# Patient Record
Sex: FEMALE | Race: WHITE | NOT HISPANIC OR LATINO | Employment: OTHER | URBAN - METROPOLITAN AREA
[De-identification: names, ages, dates, MRNs, and addresses within clinical notes are randomized per-mention and may not be internally consistent; named-entity substitution may affect disease eponyms.]

---

## 2017-01-11 DIAGNOSIS — Z00.00 HEALTH CARE MAINTENANCE: ICD-10-CM

## 2017-01-11 DIAGNOSIS — E55.9 VITAMIN D DEFICIENCY: Primary | ICD-10-CM

## 2017-01-11 DIAGNOSIS — E78.5 HYPERLIPIDEMIA, UNSPECIFIED HYPERLIPIDEMIA TYPE: ICD-10-CM

## 2017-01-16 ENCOUNTER — RESULTS ENCOUNTER (OUTPATIENT)
Dept: FAMILY MEDICINE CLINIC | Facility: CLINIC | Age: 77
End: 2017-01-16

## 2017-01-16 DIAGNOSIS — Z00.00 HEALTH CARE MAINTENANCE: ICD-10-CM

## 2017-01-16 DIAGNOSIS — E78.5 HYPERLIPIDEMIA, UNSPECIFIED HYPERLIPIDEMIA TYPE: ICD-10-CM

## 2017-01-16 DIAGNOSIS — E55.9 VITAMIN D DEFICIENCY: ICD-10-CM

## 2017-01-16 LAB
25(OH)D3+25(OH)D2 SERPL-MCNC: 39 NG/ML (ref 30–100)
ALBUMIN SERPL-MCNC: 4.1 G/DL (ref 3.5–5.2)
ALBUMIN/GLOB SERPL: 1.5 G/DL
ALP SERPL-CCNC: 92 U/L (ref 39–117)
ALT SERPL-CCNC: 19 U/L (ref 1–33)
AST SERPL-CCNC: 17 U/L (ref 1–32)
BASOPHILS # BLD AUTO: 0.02 10*3/MM3 (ref 0–0.2)
BASOPHILS NFR BLD AUTO: 0.3 % (ref 0–1.5)
BILIRUB SERPL-MCNC: 0.4 MG/DL (ref 0.1–1.2)
BUN SERPL-MCNC: 19 MG/DL (ref 8–23)
BUN/CREAT SERPL: 22.6 (ref 7–25)
CALCIUM SERPL-MCNC: 9.5 MG/DL (ref 8.6–10.5)
CHLORIDE SERPL-SCNC: 102 MMOL/L (ref 98–107)
CHOLEST SERPL-MCNC: 190 MG/DL (ref 0–200)
CO2 SERPL-SCNC: 25.7 MMOL/L (ref 22–29)
CREAT SERPL-MCNC: 0.84 MG/DL (ref 0.57–1)
EOSINOPHIL # BLD AUTO: 0.16 10*3/MM3 (ref 0–0.7)
EOSINOPHIL NFR BLD AUTO: 2.5 % (ref 0.3–6.2)
ERYTHROCYTE [DISTWIDTH] IN BLOOD BY AUTOMATED COUNT: 13.5 % (ref 11.7–13)
GLOBULIN SER CALC-MCNC: 2.7 GM/DL
GLUCOSE SERPL-MCNC: 97 MG/DL (ref 65–99)
HCT VFR BLD AUTO: 40.4 % (ref 35.6–45.5)
HDLC SERPL-MCNC: 62 MG/DL (ref 40–60)
HGB BLD-MCNC: 12.8 G/DL (ref 11.9–15.5)
IMM GRANULOCYTES # BLD: 0 10*3/MM3 (ref 0–0.03)
IMM GRANULOCYTES NFR BLD: 0 % (ref 0–0.5)
LDLC SERPL CALC-MCNC: 113 MG/DL (ref 0–100)
LDLC/HDLC SERPL: 1.82 {RATIO}
LYMPHOCYTES # BLD AUTO: 1.44 10*3/MM3 (ref 0.9–4.8)
LYMPHOCYTES NFR BLD AUTO: 22.3 % (ref 19.6–45.3)
MCH RBC QN AUTO: 30.5 PG (ref 26.9–32)
MCHC RBC AUTO-ENTMCNC: 31.7 G/DL (ref 32.4–36.3)
MCV RBC AUTO: 96.4 FL (ref 80.5–98.2)
MONOCYTES # BLD AUTO: 0.48 10*3/MM3 (ref 0.2–1.2)
MONOCYTES NFR BLD AUTO: 7.4 % (ref 5–12)
NEUTROPHILS # BLD AUTO: 4.35 10*3/MM3 (ref 1.9–8.1)
NEUTROPHILS NFR BLD AUTO: 67.5 % (ref 42.7–76)
PLATELET # BLD AUTO: 274 10*3/MM3 (ref 140–500)
POTASSIUM SERPL-SCNC: 5.1 MMOL/L (ref 3.5–5.2)
PROT SERPL-MCNC: 6.8 G/DL (ref 6–8.5)
RBC # BLD AUTO: 4.19 10*6/MM3 (ref 3.9–5.2)
SODIUM SERPL-SCNC: 140 MMOL/L (ref 136–145)
TRIGL SERPL-MCNC: 75 MG/DL (ref 0–150)
VLDLC SERPL CALC-MCNC: 15 MG/DL (ref 5–40)
WBC # BLD AUTO: 6.45 10*3/MM3 (ref 4.5–10.7)

## 2017-01-23 ENCOUNTER — OFFICE VISIT (OUTPATIENT)
Dept: FAMILY MEDICINE CLINIC | Facility: CLINIC | Age: 77
End: 2017-01-23

## 2017-01-23 VITALS
HEART RATE: 90 BPM | BODY MASS INDEX: 28.44 KG/M2 | TEMPERATURE: 98.3 F | HEIGHT: 64 IN | DIASTOLIC BLOOD PRESSURE: 80 MMHG | SYSTOLIC BLOOD PRESSURE: 124 MMHG | OXYGEN SATURATION: 96 % | RESPIRATION RATE: 16 BRPM | WEIGHT: 166.6 LBS

## 2017-01-23 DIAGNOSIS — Z00.00 HEALTH CARE MAINTENANCE: ICD-10-CM

## 2017-01-23 DIAGNOSIS — I10 ESSENTIAL HYPERTENSION: ICD-10-CM

## 2017-01-23 DIAGNOSIS — M17.11 PRIMARY OSTEOARTHRITIS OF RIGHT KNEE: Primary | ICD-10-CM

## 2017-01-23 PROCEDURE — 99214 OFFICE O/P EST MOD 30 MIN: CPT | Performed by: INTERNAL MEDICINE

## 2017-01-23 NOTE — MR AVS SNAPSHOT
Terri MORENO Garrett   2017 1:45 PM   Office Visit    Dept Phone:  [phone]   Encounter #:  [id number]    Provider:  Nathan Lazo MD   Department:  [facility]                Your Full Care Plan              Your Updated Medication List          This list is accurate as of: 17  2:16 PM.  Always use your most recent med list.                aspirin 81 MG EC tablet       bacitracin 500 UNIT/GM ointment       calcium chew 500 MG chewable tablet       CHLOR-TRIMETON ALLERGY 12 MG tablet controlled-release   Generic drug:  Chlorpheniramine Maleate ER       clobetasol 0.05 % ointment   Commonly known as:  TEMOVATE       GLUCOSAMINE CHONDR 1500 COMPLX PO       losartan 100 MG tablet   Commonly known as:  COZAAR   TAKE 1 TABLET DAILY (NEED APPOINTMENT WITH NEW DOCTOR BEFORE THIS PRESCRIPTION RUNS OUT )       MULTI FOR HER 50+ PO               You Were Diagnosed With        Codes Comments    Primary osteoarthritis of right knee    -  Primary ICD-10-CM: M17.11  ICD-9-CM: 715.16       Instructions     None    Patient Instructions History      Upcoming Appointments     Visit Type Date Time Department    OFFICE VISIT 2017  1:45 PM [employer] Signup     [facility] allows you to send messages to your doctor, view your test results, renew your prescriptions, schedule appointments, and more. To sign up, go to [employer] and click on the Sign Up Now link in the New User? box. Enter your [employer] Activation Code exactly as it appears below along with the last four digits of your Social Security Number and your Date of Birth () to complete the sign-up process. If you do not sign up before the expiration date, you must request a new code.    [employer] Activation Code: [email or web address]  Expires: 2017 11:21 AM    If you have questions, you can email [email or web address] or call [phone] to talk to our  "MyChart staff. Remember, MyChart is NOT to be used for urgent needs. For medical emergencies, dial 911.               Other Info from Your Visit           Allergies     Chocolate      Citrus      Codeine      Other      Balsam of [place] - causes contact dermatitis    Pneumococcal Polysaccharide Vaccine        Reason for Visit     Hyperlipidemia f/u, already had labs drawn    Hypertension           Vital Signs     Blood Pressure Pulse Temperature Respirations Height Weight    124/80 90 98.3 °F (36.8 °C) (Oral) 16 63.5\" (161.3 cm) 166 lb 9.6 oz (75.6 kg)    Oxygen Saturation Body Mass Index Smoking Status             96% 29.05 kg/m2 Former Smoker         Problems and Diagnoses Noted     Degenerative joint disease of lower leg        "

## 2017-01-23 NOTE — PROGRESS NOTES
"Subjective   Patient ID: Terri Garrett is a 76 y.o. female presents with   Chief Complaint   Patient presents with   • Hyperlipidemia     f/u, already had labs drawn   • Hypertension       HPI - this patient has a history of mild hyperlipidemia, hypertension, osteoarthritis the right knee and degenerative disc disease of the neck.  We reviewed her labs.  Overall labs look good.  Blood pressure is well controlled with losartan.  She is caught up on routine screenings.  She cannot take the Prevnar because she's had trouble with Pneumovax in the past.  Overall she feels good however.    Assessment plan    Assessment plan    Primary osteoarthritis right knee-recommend the patient stay active and continuing doing physical therapy exercises when necessary nonsteroidal anti-inflammatories.    Hypertension controlled with losartan 100    Health care maintenance-she is caught up on mammogram cannot take Prevnar.        Allergies   Allergen Reactions   • Chocolate    • Citrus    • Codeine    • Other      Balsam of [place] - causes contact dermatitis   • Pneumococcal Polysaccharide Vaccine        The following portions of the patient's history were reviewed and updated as appropriate: allergies, current medications, past family history, past medical history, past social history, past surgical history and problem list.      Review of Systems   Constitutional: Negative.    HENT: Negative.    Eyes: Negative.    Respiratory: Negative.    Cardiovascular: Negative.    Gastrointestinal: Negative.    Endocrine: Negative.    Genitourinary: Negative.    Musculoskeletal: Positive for arthralgias, gait problem and neck pain.   Skin: Negative.    Psychiatric/Behavioral: Negative.        Objective     Vitals:    01/23/17 1328   BP: 124/80   Pulse: 90   Resp: 16   Temp: 98.3 °F (36.8 °C)   TempSrc: Oral   SpO2: 96%   Weight: 166 lb 9.6 oz (75.6 kg)   Height: 63.5\" (161.3 cm)         Physical Exam   Constitutional: She is oriented to " person, place, and time. She appears well-developed and well-nourished. No distress.   HENT:   Head: Normocephalic and atraumatic.   Eyes: Conjunctivae and EOM are normal. Pupils are equal, round, and reactive to light. Right eye exhibits no discharge. Left eye exhibits no discharge. No scleral icterus.   Neck: Normal range of motion. Neck supple. No tracheal deviation present. No thyromegaly present.   Cardiovascular: Normal rate, regular rhythm, normal heart sounds and normal pulses.  Exam reveals no gallop.    No murmur heard.  Pulmonary/Chest: Effort normal and breath sounds normal. No respiratory distress. She has no wheezes. She has no rales.   Abdominal: Soft. Bowel sounds are normal. There is no tenderness.   Musculoskeletal: Normal range of motion.   Neurological: She is alert and oriented to person, place, and time.   Skin: Skin is warm and dry. No rash noted. No erythema. No pallor.   Psychiatric: She has a normal mood and affect. Her behavior is normal. Judgment and thought content normal.   Nursing note and vitals reviewed.        Terri was seen today for hyperlipidemia and hypertension.    Diagnoses and all orders for this visit:    Primary osteoarthritis of right knee    Essential hypertension    Health care maintenance        Call or return to clinic prn if these symptoms worsen or fail to improve as anticipated.

## 2017-02-22 RX ORDER — LOSARTAN POTASSIUM 100 MG/1
TABLET ORAL
Qty: 90 TABLET | Refills: 2 | Status: SHIPPED | OUTPATIENT
Start: 2017-02-22 | End: 2017-12-05 | Stop reason: SDUPTHER

## 2017-12-05 RX ORDER — LOSARTAN POTASSIUM 100 MG/1
TABLET ORAL
Qty: 90 TABLET | Refills: 2 | Status: SHIPPED | OUTPATIENT
Start: 2017-12-05

## 2018-02-06 ENCOUNTER — APPOINTMENT (OUTPATIENT)
Dept: WOMENS IMAGING | Facility: HOSPITAL | Age: 78
End: 2018-02-06

## 2018-02-06 PROCEDURE — 77067 SCR MAMMO BI INCL CAD: CPT | Performed by: RADIOLOGY

## 2018-02-06 PROCEDURE — 77063 BREAST TOMOSYNTHESIS BI: CPT | Performed by: RADIOLOGY

## 2018-05-30 ENCOUNTER — OFFICE VISIT (OUTPATIENT)
Dept: ORTHOPEDIC SURGERY | Facility: CLINIC | Age: 78
End: 2018-05-30

## 2018-05-30 VITALS — HEIGHT: 64 IN | BODY MASS INDEX: 27.69 KG/M2 | TEMPERATURE: 97.7 F | WEIGHT: 162.2 LBS

## 2018-05-30 DIAGNOSIS — G89.29 CHRONIC PAIN OF RIGHT KNEE: Primary | ICD-10-CM

## 2018-05-30 DIAGNOSIS — M25.561 CHRONIC PAIN OF RIGHT KNEE: Primary | ICD-10-CM

## 2018-05-30 DIAGNOSIS — M17.11 PRIMARY OSTEOARTHRITIS OF RIGHT KNEE: ICD-10-CM

## 2018-05-30 PROCEDURE — 73564 X-RAY EXAM KNEE 4 OR MORE: CPT | Performed by: ORTHOPAEDIC SURGERY

## 2018-05-30 PROCEDURE — 99214 OFFICE O/P EST MOD 30 MIN: CPT | Performed by: ORTHOPAEDIC SURGERY

## 2018-05-30 NOTE — PROGRESS NOTES
"Patient:  Terri Garrett is a 77 y.o. female    Chief Complaint/ Reason for Visit:    Chief Complaint   Patient presents with   • Right Knee - Establish Care, Pain       HPI:  This pleasant lady presents today complaining of progressively worsening moderate occasionally severe aching occasionally stabbing pain in the posterior medial aspect of her right knee.  She says that it locks up oftentimes after prolonged standing and walking, and she actually has to use her hands to \"unlock it.\"  She says at times, she will have \"severe, excruciating pain\" and points to the posterior medial aspect of the right knee as its location.  She says \"I can't straighten my knee out.\"  I have seen her in the past for the right knee, and she is done physical therapy for months, but says \"my motion has actually gotten worse.\"  She does have episodic swelling in the right knee associated with prolonged weightbearing activities.    She cannot go down stairs reciprocally, and has severe sharp pain medially and anteriorly in the right knee when descending stairs, even \"1 leg at a time.\"  Rest, elevation, and reduced activities are the only things that seemed to help.      PMH:    Past Medical History:   Diagnosis Date   • Breast cancer    • Hypertension        PSH:    Past Surgical History:   Procedure Laterality Date   • BREAST LUMPECTOMY     • CATARACT EXTRACTION     • KNEE SURGERY Right 2013    [facility]       Social Hx:    Social History     Social History   • Marital status:      Spouse name: N/A   • Number of children: N/A   • Years of education: N/A     Occupational History   • Not on file.     Social History Main Topics   • Smoking status: Former Smoker     Quit date: 8/22/1976   • Smokeless tobacco: Never Used   • Alcohol use Yes      Comment: 1 glass of wine with dinner   • Drug use: Unknown   • Sexual activity: Defer     Other Topics Concern   • Not on file     Social History Narrative   • No narrative on file " "      Family Hx:    Family History   Problem Relation Age of Onset   • Cancer Mother         breast       Meds:    Current Outpatient Prescriptions:   •  aspirin 81 MG EC tablet, Take  by mouth daily., Disp: , Rfl:   •  Calcium Carbonate (CALCIUM CHEW) 500 MG chewable tablet, Chew daily., Disp: , Rfl:   •  Chlorpheniramine Maleate ER (CHLOR-TRIMETON ALLERGY) 12 MG tablet controlled-release, Take  by mouth., Disp: , Rfl:   •  Glucosamine-Chondroit-Vit C-Mn (GLUCOSAMINE CHONDR 1500 COMPLX PO), Take  by mouth daily., Disp: , Rfl:   •  losartan (COZAAR) 100 MG tablet, TAKE 1 TABLET DAILY (NEED APPOINTMENT WITH NEW DOCTOR BEFORE THIS PRESCRIPTION RUNS OUT ), Disp: 90 tablet, Rfl: 2  •  Multiple Vitamins-Minerals (MULTI FOR HER 50+ PO), Take  by mouth daily., Disp: , Rfl:   •  bacitracin 500 UNIT/GM ointment, Apply  topically., Disp: , Rfl:   •  clobetasol (TEMOVATE) 0.05 % ointment, Apply 1 inch topically 2 (two) times a day., Disp: , Rfl:     Allergies:    Allergies   Allergen Reactions   • Chocolate    • Citrus    • Codeine    • Other      Balsam of [place] - causes contact dermatitis   • Pneumococcal Polysaccharide Vaccine        ROS:  Review of Systems   Musculoskeletal: Positive for arthralgias and joint swelling.   All other systems reviewed and are negative.      Vitals:    05/30/18 1435   Temp: 97.7 °F (36.5 °C)   TempSrc: Temporal Artery    Weight: 73.6 kg (162 lb 3.2 oz)   Height: 161.3 cm (63.5\")     Body mass index is 28.28 kg/m².    Physical Exam    The patient is awake, alert, and oriented ×3.  The patient is in no acute distress.  Breathing is regular and unlabored with a respiratory rate of 12/m.  Extraocular movements and pupillary responses are symmetrically intact. Sclerae are anicteric.   Hearing is within normal limits.  Speech is within normal limits.  There is no jugular venous distention.    The patient's gait reveals antalgia from the right.  She has no obvious atrophy or asymmetry of the " musculature lower extremities.    Right knee: Range of motion is 18° to 125° of flexion.  She has medial instability on valgus stressing.  She has marketed tenderness along the medial joint line, and has osteophytes palpably around all 3 compartments.  There is crepitus on active and passive ranges of motion of the right knee.  The patient does have a mild to moderate effusion, but there is no abnormal warmth, erythema, bruising, or other discoloration.    Left knee: Range of motion is 6° to 134° of flexion.  There is no pronounced effusion or tenderness at this time.  There are palpable osteophytes and crepitus noted.    Distally, the patient has 2+ dorsalis pedis and 1+ posterior tibial pulses present symmetrically in both feet with a current regular heart rate of about 72 bpm.  Sensory exams intact light touch in all toes.  Motor strength is 5 over 5 bilaterally.        Radiology:X-rays: 4 views the patient's right knee, with incidental views of the left knee, were ordered and reviewed today to assess her complaints of worsening right knee pain.  These were reviewed and were compared to images we had from August 2016 of the right knee.  Today's images reveal the patient has advanced osteoarthritis of the right knee with tricompartmental degenerative change and osteophyte formation, bone-on-bone changes in the medial compartment, and narrowing of the patellofemoral compartment as well.  In comparison to the previous images from August 2016, the arthritis has progressed.  Medial joint line narrowing has increased to some degree, and the patellofemoral joint line narrowing has increased to a somewhat greater degree.  I do not see any evidence of acute fracture or stress fracture on today's images.          Assessment:     Diagnosis Plan   1. Chronic pain of right knee  XR Knee 4+ View Right   2. Primary osteoarthritis of right knee             Plan:  The patient and I had a long and detailed discussion about her  options and her treatments to date.  She would like a cortisone shot, but has an upcoming trip in mid July, and would like to wait until just before that trip to have the injection.    She's been doing physical therapy since I prescribed at about 2 years ago, and continues to do it on her own as a part of her regular fitness routine.    She does not achieve much relief from oral medications, and has in the past taken Aleve, Advil, aspirin, Tylenol, and even some prescription medications that she doesn't remember.    Clearly at some point she is going to require total knee replacement.  I advised her that she needed to start to plan for that, as I am very concerned about her 18° flexion contracture in the right knee.  We did review that the prime determinant of range of motion after knee replacement, was the range of motion that the patient had in the knee before the surgery.    She says she understands.      Orders Placed This Encounter   Procedures   • XR Knee 4+ View Right     Order Specific Question:   Reason for Exam:     Answer:   knee

## 2018-07-12 ENCOUNTER — OFFICE VISIT (OUTPATIENT)
Dept: ORTHOPEDIC SURGERY | Facility: CLINIC | Age: 78
End: 2018-07-12

## 2018-07-12 VITALS — TEMPERATURE: 98 F | BODY MASS INDEX: 27.83 KG/M2 | WEIGHT: 163 LBS | HEIGHT: 64 IN

## 2018-07-12 DIAGNOSIS — M17.11 PRIMARY OSTEOARTHRITIS OF RIGHT KNEE: Primary | ICD-10-CM

## 2018-07-12 DIAGNOSIS — M25.561 CHRONIC PAIN OF RIGHT KNEE: ICD-10-CM

## 2018-07-12 DIAGNOSIS — G89.29 CHRONIC PAIN OF RIGHT KNEE: ICD-10-CM

## 2018-07-12 PROCEDURE — 99213 OFFICE O/P EST LOW 20 MIN: CPT | Performed by: ORTHOPAEDIC SURGERY

## 2018-07-12 PROCEDURE — 20610 DRAIN/INJ JOINT/BURSA W/O US: CPT | Performed by: ORTHOPAEDIC SURGERY

## 2018-07-12 RX ORDER — METHYLPREDNISOLONE ACETATE 80 MG/ML
80 INJECTION, SUSPENSION INTRA-ARTICULAR; INTRALESIONAL; INTRAMUSCULAR; SOFT TISSUE
Status: COMPLETED | OUTPATIENT
Start: 2018-07-12 | End: 2018-07-12

## 2018-07-12 RX ORDER — LIDOCAINE HYDROCHLORIDE 20 MG/ML
4 INJECTION, SOLUTION EPIDURAL; INFILTRATION; INTRACAUDAL; PERINEURAL
Status: COMPLETED | OUTPATIENT
Start: 2018-07-12 | End: 2018-07-12

## 2018-07-12 RX ADMIN — METHYLPREDNISOLONE ACETATE 80 MG: 80 INJECTION, SUSPENSION INTRA-ARTICULAR; INTRALESIONAL; INTRAMUSCULAR; SOFT TISSUE at 10:29

## 2018-07-12 RX ADMIN — LIDOCAINE HYDROCHLORIDE 4 ML: 20 INJECTION, SOLUTION EPIDURAL; INFILTRATION; INTRACAUDAL; PERINEURAL at 10:29

## 2018-07-12 NOTE — PROGRESS NOTES
Patient:  Terri Garrett is a 78 y.o. female    Chief Complaint/ Reason for Visit:    Chief Complaint   Patient presents with   • Right Knee - Follow-up, Pain       HPI:  Patient returns today for evaluation of her osteoarthritic right knee pain.  She is leaving on a trip for [place] next week, and is concerned that the pain may significantly hamper her ability to enjoy her travels.  The pain is moderate in intensity aching in nature, exacerbated by walking, standing, and particularly stairs, and alleviated primarily by rest.      PMH:    Past Medical History:   Diagnosis Date   • Breast cancer (CMS/HCC)    • Hypertension        PSH:    Past Surgical History:   Procedure Laterality Date   • BREAST LUMPECTOMY     • CATARACT EXTRACTION     • KNEE SURGERY Right 2013    [facility]       Social Hx:    Social History     Social History   • Marital status:      Spouse name: N/A   • Number of children: N/A   • Years of education: N/A     Occupational History   • Not on file.     Social History Main Topics   • Smoking status: Former Smoker     Quit date: 8/22/1976   • Smokeless tobacco: Never Used   • Alcohol use Yes      Comment: 1 glass of wine with dinner   • Drug use: Unknown   • Sexual activity: Defer     Other Topics Concern   • Not on file     Social History Narrative   • No narrative on file       Family Hx:    Family History   Problem Relation Age of Onset   • Cancer Mother         breast       Meds:    Current Outpatient Prescriptions:   •  aspirin 81 MG EC tablet, Take  by mouth daily., Disp: , Rfl:   •  Calcium Carbonate (CALCIUM CHEW) 500 MG chewable tablet, Chew daily., Disp: , Rfl:   •  Chlorpheniramine Maleate ER (CHLOR-TRIMETON ALLERGY) 12 MG tablet controlled-release, Take  by mouth., Disp: , Rfl:   •  clobetasol (TEMOVATE) 0.05 % ointment, Apply 1 inch topically 2 (two) times a day., Disp: , Rfl:   •  Glucosamine-Chondroit-Vit C-Mn (GLUCOSAMINE CHONDR 1500 COMPLX PO), Take  by mouth daily., Disp: ,  "Rfl:   •  losartan (COZAAR) 100 MG tablet, TAKE 1 TABLET DAILY (NEED APPOINTMENT WITH NEW DOCTOR BEFORE THIS PRESCRIPTION RUNS OUT ), Disp: 90 tablet, Rfl: 2  •  Multiple Vitamins-Minerals (MULTI FOR HER 50+ PO), Take  by mouth daily., Disp: , Rfl:   •  bacitracin 500 UNIT/GM ointment, Apply  topically., Disp: , Rfl:     Allergies:    Allergies   Allergen Reactions   • Chocolate    • Citrus    • Codeine    • Other      Balsam of [place] - causes contact dermatitis   • Pneumococcal Polysaccharide Vaccine        ROS:  Review of Systems    Vitals:    07/12/18 1025   Temp: 98 °F (36.7 °C)   Weight: 73.9 kg (163 lb)   Height: 162.6 cm (64\")     Body mass index is 27.98 kg/m².    Physical Exam    The patient is awake, alert, and oriented ×3.  The patient is in no acute distress.  Breathing is regular and unlabored with a respiratory rate of 12/m.  Extraocular movements and pupillary responses are symmetrically intact. Sclerae are anicteric.   Hearing is within normal limits.  Speech is within normal limits.  There is no jugular venous distention.    The patient's gait reveals antalgia from the right.  She has no obvious atrophy or asymmetry of the musculature lower extremities.     Right knee: Range of motion is 15° to 122° of flexion.  She has medial instability on valgus stressing.  She has marketed tenderness along the medial joint line, and has osteophytes palpably around all 3 compartments.  There is crepitus on active and passive ranges of motion of the right knee.  The patient does have a mild to moderate effusion, but there is no abnormal warmth, erythema, bruising, or other discoloration.       Distally, the patient has 2+ dorsalis pedis and 1+ posterior tibial pulses present symmetrically in both feet with a current regular heart rate of about 72 bpm.  Sensory exams intact light touch in all toes.  Motor strength is 5 over 5 bilaterally.            Assessment:     Diagnosis Plan   1. Primary osteoarthritis of right " "knee  Ambulatory Referral to Physical Therapy Evaluate and treat, Ortho   2. Chronic pain of right knee  Ambulatory Referral to Physical Therapy Evaluate and treat, Ortho           Plan:  After discussion of the options, the patient agreed with my recommendation for physical therapy.  Given her upcoming trip, she has also requested a \"cortisone shot\".  This was accomplished as documented below after informed consent was obtained.  The patient tolerated it well and seemed to have good early relief.  Postinjection precautions and instructions were reviewed, and the patient voiced understanding.      Orders Placed This Encounter   Procedures   • Large Joint Arthrocentesis     This order was created via procedure documentation   • Ambulatory Referral to Physical Therapy Evaluate and treat, Ortho     Referral Priority:   Routine     Referral Type:   Therapy     Referral Reason:   Specialty Services Required     Requested Specialty:   Physical Therapy     Number of Visits Requested:   1   Large Joint Arthrocentesis  Date/Time: 7/12/2018 10:29 AM  Consent given by: patient  Site marked: site marked  Timeout: Immediately prior to procedure a time out was called to verify the correct patient, procedure, equipment, support staff and site/side marked as required   Supporting Documentation  Indications: pain and joint swelling   Procedure Details  Location: knee - R knee  Preparation: Patient was prepped and draped in the usual sterile fashion  Needle size: 22 G  Approach: anteromedial  Medications administered: 4 mL lidocaine PF 2% 2 %; 80 mg methylPREDNISolone acetate 80 MG/ML  Patient tolerance: patient tolerated the procedure well with no immediate complications        "

## 2018-09-26 ENCOUNTER — OFFICE VISIT (OUTPATIENT)
Dept: ORTHOPEDIC SURGERY | Facility: CLINIC | Age: 78
End: 2018-09-26

## 2018-09-26 VITALS — HEIGHT: 64 IN | BODY MASS INDEX: 27.79 KG/M2 | TEMPERATURE: 97.7 F | WEIGHT: 162.8 LBS

## 2018-09-26 DIAGNOSIS — M75.81 RIGHT ROTATOR CUFF TENDONITIS: ICD-10-CM

## 2018-09-26 DIAGNOSIS — M25.511 RIGHT SHOULDER PAIN, UNSPECIFIED CHRONICITY: Primary | ICD-10-CM

## 2018-09-26 PROCEDURE — 73030 X-RAY EXAM OF SHOULDER: CPT | Performed by: ORTHOPAEDIC SURGERY

## 2018-09-26 PROCEDURE — 99213 OFFICE O/P EST LOW 20 MIN: CPT | Performed by: ORTHOPAEDIC SURGERY

## 2018-09-26 NOTE — PROGRESS NOTES
"Patient:  Terri Garrett is a 78 y.o. female    Chief Complaint/ Reason for Visit:    Chief Complaint   Patient presents with   • Right Shoulder - Establish Care, Pain   • Right Knee - Follow-up       HPI:  This pleasant lady presents today for a scheduled visit and follow-up on her right knee pain.  She has had an injection and done physical therapy and says, \"my knee is incredible!\"  She goes on to say \"nothing hurts in my knee, and I can do anything I want.  She goes on to tell me about all the walking and outdoor activities as well as her ability to go up and down stairs pain-free with respect to her right knee.  She is very pleased.    However, the patient has a new complaint which constitutes a new problem that is new to this examiner involving some mild aching pain on the lateral proximal aspect of her nondominant right arm and shoulder.  She has not injured the shoulder that she can recall.  She says it doesn't hurt all the time, but she notices it when she is getting dressed, putting on her bra, and putting on and taking off some types of shirts and blouses.  She also says that sometimes when she rolls onto her right side at night she will have pain in her shoulder that wakes her up.  She does not have any numbness, tingling, or weakness.  The pain is nonradiating.  She does not have any pain in her dominant left shoulder.  She has not injured her right shoulder, and cannot think of any excessive or unusual activities involving the right upper extremity that may have caused the onset of this discomfort that has now been bothering her for about a month.      PMH:    Past Medical History:   Diagnosis Date   • Breast cancer (CMS/HCC)    • Hypertension        PSH:    Past Surgical History:   Procedure Laterality Date   • BREAST LUMPECTOMY     • CATARACT EXTRACTION     • KNEE SURGERY Right 2013    menscal       Social Hx:    Social History     Social History   • Marital status:      Spouse name: N/A " "  • Number of children: N/A   • Years of education: N/A     Occupational History   • Not on file.     Social History Main Topics   • Smoking status: Former Smoker     Quit date: 8/22/1976   • Smokeless tobacco: Never Used   • Alcohol use Yes      Comment: 1 glass of wine with dinner   • Drug use: Unknown   • Sexual activity: Defer     Other Topics Concern   • Not on file     Social History Narrative   • No narrative on file       Family Hx:    Family History   Problem Relation Age of Onset   • Cancer Mother         breast       Meds:    Current Outpatient Prescriptions:   •  aspirin 81 MG EC tablet, Take  by mouth daily., Disp: , Rfl:   •  Calcium Carbonate (CALCIUM CHEW) 500 MG chewable tablet, Chew daily., Disp: , Rfl:   •  Chlorpheniramine Maleate ER (CHLOR-TRIMETON ALLERGY) 12 MG tablet controlled-release, Take  by mouth., Disp: , Rfl:   •  Glucosamine-Chondroit-Vit C-Mn (GLUCOSAMINE CHONDR 1500 COMPLX PO), Take  by mouth daily., Disp: , Rfl:   •  losartan (COZAAR) 100 MG tablet, TAKE 1 TABLET DAILY (NEED APPOINTMENT WITH NEW DOCTOR BEFORE THIS PRESCRIPTION RUNS OUT ), Disp: 90 tablet, Rfl: 2  •  Multiple Vitamins-Minerals (MULTI FOR HER 50+ PO), Take  by mouth daily., Disp: , Rfl:   •  bacitracin 500 UNIT/GM ointment, Apply  topically., Disp: , Rfl:   •  clobetasol (TEMOVATE) 0.05 % ointment, Apply 1 inch topically 2 (two) times a day., Disp: , Rfl:     Allergies:    Allergies   Allergen Reactions   • Chocolate    • Citrus    • Codeine    • Other      Balsam of [place] - causes contact dermatitis   • Pneumococcal Polysaccharide Vaccine        ROS:  Review of Systems    Vitals:    09/26/18 1526   Temp: 97.7 °F (36.5 °C)   TempSrc: Temporal Artery    Weight: 73.8 kg (162 lb 12.8 oz)   Height: 162.6 cm (64\")     Body mass index is 27.94 kg/m².    Physical Exam    The patient is awake, alert, and oriented ×3.  The patient is in no acute distress.  Breathing is regular and unlabored with a respiratory rate of 12/m.  " Extraocular movements and pupillary responses are symmetrically intact. Sclerae are anicteric.   Hearing is within normal limits.  Speech is within normal limits.  There is no jugular venous distention.    Right knee: There is no effusion.  There is no tenderness.  The patient's range of motion is from 3° -->123°.  The knee has no abnormal warmth or discoloration.  Neurovascular exam is normal distally in the right lower extremity.    Right shoulder: The patient demonstrates a full active range of motion.  The patient has mildly positive impingement test markedly positive crank test.  Apprehension test is negative.  I don't feel any crepitus or instability in the right shoulder.  Acromioclavicular joint is nontender.  She has no axillary or antecubital lymphadenopathy in the right upper extremity.  She does not have any masses or nodules palpably in the right upper extremity.  She does have a regular right radial pulse with satisfactory amplitude and a current heart rate of about 66 bpm.  Sensory and motor function are intact in radial, median, and ulnar distributions in the right hand.   is strong.    Radiology: X-rays: A 3 view series of the patient's right shoulder was ordered and reviewed today due to her complaints of right upper arm pain.  I did not have any comparison images.  These images do not reveal any obvious acute osseous or articular pathology.  The humeral head appears properly centered on the glenoid fossa.  There are no pronounced degenerative changes.  There may be some very subtle inferior osteophyte lip formation noted on the glenoid and the inferior articular surface of the humeral head, but certainly not concerning N/A 78-year-old patient.        Assessment:     Diagnosis Plan   1. Right shoulder pain, unspecified chronicity  XR Shoulder 2+ View Right    Ambulatory Referral to Physical Therapy   2. Right rotator cuff tendonitis  Ambulatory Referral to Physical Therapy           Plan:  I  discussed everything with the patient at length.  I explained that the exam findings suggested a rotator cuff problem.  I think she will do well with physical therapy.  Expectations were reviewed.  We will see her back in a couple of months.  If she fails to improve, we may need to check an MRI.    Orders Placed This Encounter   Procedures   • XR Shoulder 2+ View Right     Order Specific Question:   Reason for Exam:     Answer:   OPNC RT. SHOULDER   • Ambulatory Referral to Physical Therapy     Referral Priority:   Routine     Referral Type:   Therapy     Referral Reason:   Patient Preference     Referral Location:   [facility] [place]     Requested Specialty:   Physical Therapy     Number of Visits Requested:   1

## 2019-02-12 ENCOUNTER — APPOINTMENT (OUTPATIENT)
Dept: WOMENS IMAGING | Facility: HOSPITAL | Age: 79
End: 2019-02-12

## 2019-02-12 PROCEDURE — 77067 SCR MAMMO BI INCL CAD: CPT | Performed by: RADIOLOGY

## 2019-02-12 PROCEDURE — 77063 BREAST TOMOSYNTHESIS BI: CPT | Performed by: RADIOLOGY

## 2019-02-21 ENCOUNTER — CONSULT (OUTPATIENT)
Dept: ORTHOPEDIC SURGERY | Facility: CLINIC | Age: 79
End: 2019-02-21

## 2019-02-21 VITALS — TEMPERATURE: 97.3 F | HEIGHT: 65 IN | BODY MASS INDEX: 26.66 KG/M2 | WEIGHT: 160 LBS

## 2019-02-21 DIAGNOSIS — G89.29 CHRONIC PAIN OF RIGHT KNEE: Primary | ICD-10-CM

## 2019-02-21 DIAGNOSIS — M17.11 PRIMARY LOCALIZED OSTEOARTHROSIS OF RIGHT LOWER LEG: ICD-10-CM

## 2019-02-21 DIAGNOSIS — M25.561 CHRONIC PAIN OF RIGHT KNEE: Primary | ICD-10-CM

## 2019-02-21 PROCEDURE — 99214 OFFICE O/P EST MOD 30 MIN: CPT | Performed by: ORTHOPAEDIC SURGERY

## 2019-02-21 PROCEDURE — 20610 DRAIN/INJ JOINT/BURSA W/O US: CPT | Performed by: ORTHOPAEDIC SURGERY

## 2019-02-21 RX ORDER — METHYLPREDNISOLONE ACETATE 80 MG/ML
80 INJECTION, SUSPENSION INTRA-ARTICULAR; INTRALESIONAL; INTRAMUSCULAR; SOFT TISSUE
Status: COMPLETED | OUTPATIENT
Start: 2019-02-21 | End: 2019-02-21

## 2019-02-21 RX ORDER — LIDOCAINE HYDROCHLORIDE 10 MG/ML
4 INJECTION, SOLUTION EPIDURAL; INFILTRATION; INTRACAUDAL; PERINEURAL
Status: COMPLETED | OUTPATIENT
Start: 2019-02-21 | End: 2019-02-21

## 2019-02-21 RX ADMIN — LIDOCAINE HYDROCHLORIDE 4 ML: 10 INJECTION, SOLUTION EPIDURAL; INFILTRATION; INTRACAUDAL; PERINEURAL at 08:59

## 2019-02-21 RX ADMIN — METHYLPREDNISOLONE ACETATE 80 MG: 80 INJECTION, SUSPENSION INTRA-ARTICULAR; INTRALESIONAL; INTRAMUSCULAR; SOFT TISSUE at 08:59

## 2020-02-13 ENCOUNTER — APPOINTMENT (OUTPATIENT)
Dept: WOMENS IMAGING | Facility: HOSPITAL | Age: 80
End: 2020-02-13

## 2020-04-23 ENCOUNTER — TELEPHONE (OUTPATIENT)
Dept: ORTHOPEDIC SURGERY | Facility: CLINIC | Age: 80
End: 2020-04-23

## 2020-04-23 NOTE — TELEPHONE ENCOUNTER
Previous [facility] patient saw SUASN 02/21/19 for RIGHT Knee pain & rec'd JANETT INJ.     Patient is requesting to be seen by RBB for OPNC / BILAT Knees / NKI / NXR / to Discuss SXs - ok to switch from [facility] to ask RBB when he would like to see patient? Patient can be reached at [phone]. Thanks /srh

## 2020-05-12 ENCOUNTER — OFFICE VISIT (OUTPATIENT)
Dept: ORTHOPEDIC SURGERY | Facility: CLINIC | Age: 80
End: 2020-05-12

## 2020-05-12 VITALS — TEMPERATURE: 98.4 F | WEIGHT: 169 LBS | HEIGHT: 64 IN | BODY MASS INDEX: 28.85 KG/M2

## 2020-05-12 DIAGNOSIS — M25.561 ACUTE BILATERAL KNEE PAIN: Primary | ICD-10-CM

## 2020-05-12 DIAGNOSIS — M25.562 ACUTE BILATERAL KNEE PAIN: Primary | ICD-10-CM

## 2020-05-12 DIAGNOSIS — M17.11 PRIMARY OSTEOARTHRITIS OF RIGHT KNEE: ICD-10-CM

## 2020-05-12 PROCEDURE — 73562 X-RAY EXAM OF KNEE 3: CPT | Performed by: ORTHOPAEDIC SURGERY

## 2020-05-12 PROCEDURE — 99214 OFFICE O/P EST MOD 30 MIN: CPT | Performed by: ORTHOPAEDIC SURGERY

## 2020-05-12 RX ORDER — PREGABALIN 75 MG/1
150 CAPSULE ORAL ONCE
Status: CANCELLED | OUTPATIENT
Start: 2020-07-06 | End: 2020-05-12

## 2020-05-12 RX ORDER — CEFAZOLIN SODIUM 2 G/100ML
2 INJECTION, SOLUTION INTRAVENOUS ONCE
Status: CANCELLED | OUTPATIENT
Start: 2020-07-06 | End: 2020-05-12

## 2020-05-12 RX ORDER — IBUPROFEN 600 MG/1
600 TABLET ORAL EVERY 6 HOURS PRN
COMMUNITY
End: 2020-05-29

## 2020-05-12 RX ORDER — CALCIUM CARBONATE 200(500)MG
TABLET,CHEWABLE ORAL
COMMUNITY
Start: 2013-09-30 | End: 2020-07-02

## 2020-05-12 RX ORDER — ESTRADIOL 0.1 MG/G
CREAM VAGINAL
COMMUNITY
Start: 2020-04-07 | End: 2020-07-02

## 2020-05-12 RX ORDER — MELOXICAM 15 MG/1
15 TABLET ORAL ONCE
Status: CANCELLED | OUTPATIENT
Start: 2020-07-06 | End: 2020-05-12

## 2020-05-12 NOTE — PROGRESS NOTES
willowPatient: Terri Garrett  YOB: 1940 79 y.o. female  Medical Record Number: [id number]    Chief Complaints:   Chief Complaint   Patient presents with   • Left Knee - OPNC   • Right Knee - OPNC       History of Present Illness:Terri Garrett is a 79 y.o. female who presents with complaints of right greater than left knee pain.  She has medial knee pain which is limiting her activities.  It has progressively worsened to the point where she is now only able to walk short distances.  She has stabbing aching pain with any weightbearing or activity.  Injections anti-inflammatories physical therapy and other conservative measures have failed to provide relief of her symptoms.    Allergies:   Allergies   Allergen Reactions   • Chocolate    • Citrus    • Codeine    • Other      Balsam of [place] - causes contact dermatitis   • Pneumococcal Polysaccharide Vaccine        Medications:   Current Outpatient Medications   Medication Sig Dispense Refill   • Calcium Carbonate (CALCIUM CHEW) 500 MG chewable tablet Chew daily.     • Chlorpheniramine Maleate ER (CHLOR-TRIMETON ALLERGY) 12 MG tablet controlled-release Take  by mouth.     • Glucosamine-Chondroit-Vit C-Mn (GLUCOSAMINE CHONDR 1500 COMPLX PO) Take  by mouth daily.     • ibuprofen (ADVIL,MOTRIN) 600 MG tablet Take 600 mg by mouth Every 6 (Six) Hours As Needed for Mild Pain .     • losartan (COZAAR) 100 MG tablet TAKE 1 TABLET DAILY (NEED APPOINTMENT WITH NEW DOCTOR BEFORE THIS PRESCRIPTION RUNS OUT ) 90 tablet 2   • aspirin 81 MG EC tablet Take  by mouth daily.     • bacitracin 500 UNIT/GM ointment Apply  topically.     • calcium carbonate (Antacid Calcium) 500 MG chewable tablet Chew.     • clobetasol (TEMOVATE) 0.05 % ointment Apply 1 inch topically 2 (two) times a day.     • estradiol (ESTRACE) 0.1 MG/GM vaginal cream      • Multiple Vitamins-Minerals (MULTI FOR HER 50+ PO) Take  by mouth daily.       No current facility-administered medications for  "this visit.          The following portions of the patient's history were reviewed and updated as appropriate: allergies, current medications, past family history, past medical history, past social history, past surgical history and problem list.    Review of Systems:   A 14 point review of systems was performed. All systems negative except pertinent positives/negative listed in HPI above    Physical Exam:   Vitals:    05/12/20 1106   Temp: 98.4 °F (36.9 °C)   TempSrc: Temporal   Weight: 76.7 kg (169 lb)   Height: 162.6 cm (64\")   PainSc:   4   PainLoc: Knee  Comment: Bilateral       General: A and O x 3, ASA, NAD    SCLERA:    Normal    DENTITION:   Normal  Knee:  right    ALIGNMENT:     Varus  ,   Patella  tracks  midline    GAIT:    Antalgic    SKIN:    No abnormality    RANGE OF MOTION:   10  -  115   DEG    STRENGTH:   4  / 5    LIGAMENTS:    No varus / valgus instability.   Negative  Lachman.    MENISCUS:     Negative   Leia       DISTAL PULSES:    Paplable    DISTAL SENSATION :   Intact    LYMPHATICS:     No   lymphadenopathy    OTHER:          - Positive   effusion      - Crepitance with ROM          Radiology:  Xrays 3viewsboth knees (ap,lateral, sunrise) were ordered and reviewed for evaluation of knee pain demonstrating advanced R >L varus osteoarthritis with bone on bone articulation, subchondral cysts, and periarticular osteophytes. In comparison to previous films there has been progression.     Assessment/Plan: R > L knee end stage OA. Failed consrervative measures.  Continuation of conservative management vs. TKA discussed.  The patient wishes to proceed with total knee replacement.  At this point the patient has failed the full compliment of conservative treatment and stating complete understanding of the risks/benefits/ anternatives wishes to proceed with surgical treatment.    Risk and benefits of surgery were reviewed.  Including, but not limited to, blood clots or pulmonary embolism, " anesthesia risk, infection, fracture, skin/leg numbness, persistent pain/crepitance/popping/catching, failure of the implant, need for future surgeries, hematoma, possible nerve or blood vessel injury, need for transfusion, and potential risk of stroke,heart attack or death, among others.  The patient understands and wishes to proceed.     It was explained that if tissue has been repaired or reconstructed, there is also an increased chance of failure which may require further management.  Following the completion of the discussion, the patient expressed understanding of this planned course of care, all their questions were answered and consent will be obtained preoperatively.    Operative Plan: right knee [facility] Total Knee Replacement an overnight staywith home health rehab        Arian Bradley MD  5/12/2020

## 2020-05-29 ENCOUNTER — OFFICE VISIT (OUTPATIENT)
Dept: ORTHOPEDIC SURGERY | Facility: CLINIC | Age: 80
End: 2020-05-29

## 2020-05-29 VITALS — BODY MASS INDEX: 28.41 KG/M2 | TEMPERATURE: 97.4 F | WEIGHT: 166.4 LBS | HEIGHT: 64 IN

## 2020-05-29 DIAGNOSIS — M17.12 PRIMARY OSTEOARTHRITIS OF LEFT KNEE: Primary | ICD-10-CM

## 2020-05-29 PROCEDURE — 20610 DRAIN/INJ JOINT/BURSA W/O US: CPT | Performed by: NURSE PRACTITIONER

## 2020-05-29 PROCEDURE — 99213 OFFICE O/P EST LOW 20 MIN: CPT | Performed by: NURSE PRACTITIONER

## 2020-05-29 RX ORDER — METHYLPREDNISOLONE ACETATE 80 MG/ML
80 INJECTION, SUSPENSION INTRA-ARTICULAR; INTRALESIONAL; INTRAMUSCULAR; SOFT TISSUE
Status: COMPLETED | OUTPATIENT
Start: 2020-05-29 | End: 2020-05-29

## 2020-05-29 RX ORDER — MELOXICAM 15 MG/1
TABLET ORAL
Qty: 30 TABLET | Refills: 3 | Status: SHIPPED | OUTPATIENT
Start: 2020-05-29

## 2020-05-29 RX ADMIN — METHYLPREDNISOLONE ACETATE 80 MG: 80 INJECTION, SUSPENSION INTRA-ARTICULAR; INTRALESIONAL; INTRAMUSCULAR; SOFT TISSUE at 16:22

## 2020-05-29 NOTE — PROGRESS NOTES
"Patient: Terri Garrett  YOB: 1940 79 y.o. female  Medical Record Number: [id number]    Chief Complaints:   Chief Complaint   Patient presents with   • Left Knee - Follow-up, Pain       History of Present Illness:Terri Garrett is a 79 y.o. female who presents with complaints of left knee pain and swelling.  Apparently she saw Dr. Bradley a couple weeks ago for her right knee, had x-rays at the time of both knees which do show arthritic changes of both right greater than left.  Typically speaking her left knee is usually\" her good knee\" however just a couple weeks ago she started with increased pain swelling especially back behind her left knee.  She saw her primary care physician and they sent her for an ultrasound to rule out blood clot but it did show a large Baker's cyst.  She describes the left knee pain as a moderate to severe constant ache worse with standing walking, better with rest    Allergies:   Allergies   Allergen Reactions   • Chocolate    • Citrus    • Codeine    • Other      Balsam of [place] - causes contact dermatitis   • Pneumococcal Polysaccharide Vaccine        Medications:   Current Outpatient Medications   Medication Sig Dispense Refill   • aspirin 81 MG EC tablet Take  by mouth daily.     • bacitracin 500 UNIT/GM ointment Apply  topically.     • calcium carbonate (Antacid Calcium) 500 MG chewable tablet Chew.     • Calcium Carbonate (CALCIUM CHEW) 500 MG chewable tablet Chew daily.     • Chlorpheniramine Maleate ER (CHLOR-TRIMETON ALLERGY) 12 MG tablet controlled-release Take  by mouth.     • clobetasol (TEMOVATE) 0.05 % ointment Apply 1 inch topically 2 (two) times a day.     • estradiol (ESTRACE) 0.1 MG/GM vaginal cream      • Glucosamine-Chondroit-Vit C-Mn (GLUCOSAMINE CHONDR 1500 COMPLX PO) Take  by mouth daily.     • ibuprofen (ADVIL,MOTRIN) 600 MG tablet Take 600 mg by mouth Every 6 (Six) Hours As Needed for Mild Pain .     • losartan (COZAAR) 100 MG tablet TAKE 1 " "TABLET DAILY (NEED APPOINTMENT WITH NEW DOCTOR BEFORE THIS PRESCRIPTION RUNS OUT ) 90 tablet 2   • Multiple Vitamins-Minerals (MULTI FOR HER 50+ PO) Take  by mouth daily.       No current facility-administered medications for this visit.          The following portions of the patient's history were reviewed and updated as appropriate: allergies, current medications, past family history, past medical history, past social history, past surgical history and problem list.    Review of Systems:   A 14 point review of systems was performed. All systems negative except pertinent positives/negative listed in HPI above    Physical Exam:   Vitals:    05/29/20 1600   Temp: 97.4 °F (36.3 °C)   Weight: 75.5 kg (166 lb 6.4 oz)   Height: 162.6 cm (64\")   PainSc:   5       General: A and O x 3, ASA, NAD    SCLERA:    Normal    DENTITION:   Normal  Skin clear no unusual lesions noted  Left knee the patient does have a palpable Baker's cyst with decreased range of motion secondary to pain with a positive Leia negative Lockman calf soft and nontender    Radiology:  Xrays 3views (ap,lateral, sunrise) previous x-rays of the left knee were reviewed and the patient does have arthritic changes    Assessment/Plan:  Osteoarthritis left knee    Patient discussed treatment options.  We will proceed with left knee cortisone injection, outpatient physical therapy, she will stop ibuprofen instead we will prescribe meloxicam daily for the next couple weeks and then as needed after that point.  She will let me know if her symptoms do not resolve    Large Joint Arthrocentesis: L knee  Date/Time: 5/29/2020 4:22 PM  Consent given by: patient  Site marked: site marked  Timeout: Immediately prior to procedure a time out was called to verify the correct patient, procedure, equipment, support staff and site/side marked as required   Supporting Documentation  Indications: pain and joint swelling   Procedure Details  Location: knee - L " knee  Preparation: Patient was prepped and draped in the usual sterile fashion  Needle size: 22 G  Approach: anterolateral  Medications administered: 80 mg methylPREDNISolone acetate 80 MG/ML; 2 mL lidocaine (cardiac)  Patient tolerance: patient tolerated the procedure well with no immediate complications

## 2020-06-30 ENCOUNTER — TRANSCRIBE ORDERS (OUTPATIENT)
Dept: PREADMISSION TESTING | Facility: HOSPITAL | Age: 80
End: 2020-06-30

## 2020-06-30 DIAGNOSIS — Z01.818 OTHER SPECIFIED PRE-OPERATIVE EXAMINATION: Primary | ICD-10-CM

## 2020-07-02 ENCOUNTER — APPOINTMENT (OUTPATIENT)
Dept: PREADMISSION TESTING | Facility: HOSPITAL | Age: 80
End: 2020-07-02

## 2020-07-02 ENCOUNTER — OFFICE VISIT (OUTPATIENT)
Dept: ORTHOPEDIC SURGERY | Facility: CLINIC | Age: 80
End: 2020-07-02

## 2020-07-02 VITALS
HEIGHT: 60 IN | DIASTOLIC BLOOD PRESSURE: 82 MMHG | SYSTOLIC BLOOD PRESSURE: 130 MMHG | WEIGHT: 165 LBS | BODY MASS INDEX: 32.39 KG/M2 | TEMPERATURE: 98.1 F

## 2020-07-02 VITALS
OXYGEN SATURATION: 96 % | BODY MASS INDEX: 32.39 KG/M2 | HEART RATE: 84 BPM | SYSTOLIC BLOOD PRESSURE: 108 MMHG | TEMPERATURE: 98 F | DIASTOLIC BLOOD PRESSURE: 72 MMHG | RESPIRATION RATE: 16 BRPM | HEIGHT: 60 IN | WEIGHT: 165 LBS

## 2020-07-02 DIAGNOSIS — M17.11 PRIMARY OSTEOARTHRITIS OF RIGHT KNEE: ICD-10-CM

## 2020-07-02 DIAGNOSIS — M17.11 PRIMARY OSTEOARTHRITIS OF RIGHT KNEE: Primary | ICD-10-CM

## 2020-07-02 LAB
ANION GAP SERPL CALCULATED.3IONS-SCNC: 7.4 MMOL/L (ref 5–15)
BACTERIA UR QL AUTO: ABNORMAL /HPF
BILIRUB UR QL STRIP: NEGATIVE
BUN SERPL-MCNC: 18 MG/DL (ref 8–23)
BUN/CREAT SERPL: 24.3 (ref 7–25)
CALCIUM SPEC-SCNC: 9.2 MG/DL (ref 8.6–10.5)
CHLORIDE SERPL-SCNC: 106 MMOL/L (ref 98–107)
CLARITY UR: ABNORMAL
CO2 SERPL-SCNC: 25.6 MMOL/L (ref 22–29)
COLOR UR: YELLOW
CREAT SERPL-MCNC: 0.74 MG/DL (ref 0.57–1)
DEPRECATED RDW RBC AUTO: 44.1 FL (ref 37–54)
ERYTHROCYTE [DISTWIDTH] IN BLOOD BY AUTOMATED COUNT: 13.2 % (ref 12.3–15.4)
GFR SERPL CREATININE-BSD FRML MDRD: 76 ML/MIN/1.73
GLUCOSE SERPL-MCNC: 85 MG/DL (ref 65–99)
GLUCOSE UR STRIP-MCNC: NEGATIVE MG/DL
HCT VFR BLD AUTO: 38.7 % (ref 34–46.6)
HGB BLD-MCNC: 13.1 G/DL (ref 12–15.9)
HGB UR QL STRIP.AUTO: NEGATIVE
HYALINE CASTS UR QL AUTO: ABNORMAL /LPF
KETONES UR QL STRIP: NEGATIVE
LEUKOCYTE ESTERASE UR QL STRIP.AUTO: ABNORMAL
MCH RBC QN AUTO: 31 PG (ref 26.6–33)
MCHC RBC AUTO-ENTMCNC: 33.9 G/DL (ref 31.5–35.7)
MCV RBC AUTO: 91.5 FL (ref 79–97)
NITRITE UR QL STRIP: NEGATIVE
PH UR STRIP.AUTO: 6.5 [PH] (ref 5–8)
PLATELET # BLD AUTO: 255 10*3/MM3 (ref 140–450)
PMV BLD AUTO: 9 FL (ref 6–12)
POTASSIUM SERPL-SCNC: 4.5 MMOL/L (ref 3.5–5.2)
PROT UR QL STRIP: NEGATIVE
RBC # BLD AUTO: 4.23 10*6/MM3 (ref 3.77–5.28)
RBC # UR: ABNORMAL /HPF
REF LAB TEST METHOD: ABNORMAL
SODIUM SERPL-SCNC: 139 MMOL/L (ref 136–145)
SP GR UR STRIP: 1.02 (ref 1–1.03)
SQUAMOUS #/AREA URNS HPF: ABNORMAL /HPF
TRANS CELLS #/AREA URNS HPF: ABNORMAL /HPF
UROBILINOGEN UR QL STRIP: ABNORMAL
WBC # BLD AUTO: 5.85 10*3/MM3 (ref 3.4–10.8)
WBC UR QL AUTO: ABNORMAL /HPF

## 2020-07-02 PROCEDURE — 93005 ELECTROCARDIOGRAM TRACING: CPT

## 2020-07-02 PROCEDURE — 85027 COMPLETE CBC AUTOMATED: CPT | Performed by: ORTHOPAEDIC SURGERY

## 2020-07-02 PROCEDURE — 87086 URINE CULTURE/COLONY COUNT: CPT | Performed by: ORTHOPAEDIC SURGERY

## 2020-07-02 PROCEDURE — 80048 BASIC METABOLIC PNL TOTAL CA: CPT | Performed by: ORTHOPAEDIC SURGERY

## 2020-07-02 PROCEDURE — 81001 URINALYSIS AUTO W/SCOPE: CPT | Performed by: ORTHOPAEDIC SURGERY

## 2020-07-02 PROCEDURE — 36415 COLL VENOUS BLD VENIPUNCTURE: CPT

## 2020-07-02 PROCEDURE — 93010 ELECTROCARDIOGRAM REPORT: CPT | Performed by: INTERNAL MEDICINE

## 2020-07-02 PROCEDURE — 99024 POSTOP FOLLOW-UP VISIT: CPT | Performed by: NURSE PRACTITIONER

## 2020-07-02 RX ORDER — CHLORHEXIDINE GLUCONATE 500 MG/1
CLOTH TOPICAL TAKE AS DIRECTED
COMMUNITY
End: 2020-07-07 | Stop reason: HOSPADM

## 2020-07-02 RX ORDER — CALCIUM CARBONATE 500(1250)
500 TABLET ORAL 2 TIMES DAILY
COMMUNITY
End: 2020-07-07 | Stop reason: HOSPADM

## 2020-07-02 RX ORDER — SENNOSIDES 8.6 MG
650 CAPSULE ORAL DAILY
COMMUNITY

## 2020-07-02 ASSESSMENT — KOOS JR
KOOS JR SCORE: 73.342
KOOS JR SCORE: 5

## 2020-07-02 NOTE — H&P (VIEW-ONLY)
Patient: Terri Garrett    Date of Admission: 7/6/2020    YOB: 1940    Medical Record Number: [id number]    Admitting Physician: Dr. Arian Bradley    Reason for Admission: End Stage Right Knee OA    History of Present Illness: 79 y.o. female presents with severe end stage knee osteoarthritis which has not been responsive to the full compliment of conservative measures. Despite conservative attempts, there is still severe, constant activity limiting pain. Given the severity of the pain, the patient has elected to proceed with knee replacement.    Allergies:   Allergies   Allergen Reactions   • Chocolate Itching   • Citrus Itching   • Codeine Nausea Only   • Other Itching     Balsam of [place] - causes contact dermatitis   • Pneumococcal Polysaccharide Vaccine Rash     BALSAM OF [place] CONTAINS FRAGRANCES AND FLAVORS IN SEVERAL BAKED GOODS AND ICE CREAMS   • Pneumococcal Vaccines Swelling and Rash         Current Medications:  Home Medications:    Current Outpatient Medications on File Prior to Visit   Medication Sig   • acetaminophen (TYLENOL) 650 MG 8 hr tablet Take 650 mg by mouth Daily.   • calcium carbonate, oyster shell, 500 MG tablet tablet Take 500 mg by mouth 2 (Two) Times a Day.   • Chlorhexidine Gluconate Cloth 2 % pads Apply  topically Take As Directed. PRIOR TO SURGERY   • Chlorpheniramine Maleate ER (CHLOR-TRIMETON ALLERGY) 12 MG tablet controlled-release Take  by mouth Daily.   • Glucosamine-Chondroit-Vit C-Mn (GLUCOSAMINE CHONDR 1500 COMPLX PO) Take 1 tablet by mouth 2 (two) times a day. HOLD FOR SURGERY   • losartan (COZAAR) 100 MG tablet TAKE 1 TABLET DAILY (NEED APPOINTMENT WITH NEW DOCTOR BEFORE THIS PRESCRIPTION RUNS OUT )   • meloxicam (MOBIC) 15 MG tablet 1 PO Daily with food. (Patient taking differently: HELD FOR SURGERY)   • Multiple Vitamins-Minerals (MULTI FOR HER 50+ PO) Take  by mouth Daily. HOLD FOR SURGERY   • mupirocin (BACTROBAN) 2 % nasal ointment into the nostril(s) as  directed by provider Take As Directed. PRIOR TO SURGERY   • [DISCONTINUED] aspirin 81 MG EC tablet Take  by mouth daily.   • [DISCONTINUED] bacitracin 500 UNIT/GM ointment Apply  topically.   • [DISCONTINUED] calcium carbonate (Antacid Calcium) 500 MG chewable tablet Chew.   • [DISCONTINUED] Calcium Carbonate (CALCIUM CHEW) 500 MG chewable tablet Chew daily.   • [DISCONTINUED] clobetasol (TEMOVATE) 0.05 % ointment Apply 1 inch topically to the appropriate area as directed As Needed.   • [DISCONTINUED] estradiol (ESTRACE) 0.1 MG/GM vaginal cream      Current Facility-Administered Medications on File Prior to Visit   Medication   • Chlorhexidine Gluconate 2 % pads 2 each   • mupirocin (BACTROBAN) 2 % nasal ointment     PRN Meds:.    PMH:     Past Medical History:   Diagnosis Date   • Allergic reaction     FOOD FLAVORINGS AND ADDITIVES   • Arthritis    • Breast cancer (CMS/HCC)    • Hard of hearing     BILATERAL AIDS   • History of foot fracture     LEFT   • Hypertension    • Insomnia    • Knee pain     RIGHT   • Spinal headache     S/P SPINAL BLOCK W/CHILDBIRTH       PF/Surg/Soc Hx:     Past Surgical History:   Procedure Laterality Date   • BREAST LUMPECTOMY Left     RADIATION   • CATARACT EXTRACTION Bilateral    • KNEE SURGERY Right     menscal   • LAPAROTOMY OOPHERECTOMY Left    • REFRACTIVE SURGERY Bilateral     SCAR TISSUE S/P CATARACTS SURGERY   • SENTINEL LYMPH NODE BIOPSY Left             Social History     Occupational History   • Not on file   Tobacco Use   • Smoking status: Former Smoker     Packs/day: 1.00     Years: 15.00     Pack years: 15.00     Last attempt to quit: 1976     Years since quittin.8   • Smokeless tobacco: Never Used   Substance and Sexual Activity   • Alcohol use: Yes     Alcohol/week: 7.0 standard drinks     Types: 7 Glasses of wine per week   • Drug use: Never   • Sexual activity: Not on file      Social History     Social History Narrative   • Not on file       "  Family History   Problem Relation Age of Onset   • Cancer Mother         breast   • Malig Hyperthermia Neg Hx          Review of Systems:   A 14 point review of systems was performed, pertinent positives discussed above, all other systems are negative    Physical Exam: 79 y.o. female  Vital Signs :   Vitals:    07/02/20 1413   BP: 130/82   Temp: 98.1 °F (36.7 °C)   Weight: 74.8 kg (165 lb)   Height: 152.4 cm (60\")   PainSc:   5     General: Alert and Oriented x 3, No acute distress.  Psych: mood and affect appropriate; recent and remote memory intact  Eyes: conjunctiva clear; pupils equally round and reactive, sclera nonicteric  CV: no peripheral edema  Resp: normal respiratory effort  Skin: no rashes or wounds; normal turgor  Musculosketetal; pain and crepitance with knee range of motion  Vascular: palpable distal pulses    Xrays:  -3 views (AP, lateral, and sunrise) were reviewed demonstrating end-stage OA with bone on bone articulation.    Assessment:  End-stage Right knee osteoarthritis. Conservative measures have failed.      Plan:  The plan is to proceed with Right Total Knee Replacement. The patient voiced understanding of the risks, benefits, and alternative forms of treatment that were discussed with Dr Bradley at the time of scheduling. 23     Tawny Metcalf, APRN  7/2/2020        "

## 2020-07-02 NOTE — DISCHARGE INSTRUCTIONS
CHLORHEXIDINE CLOTH INSTRUCTIONS  The morning of surgery follow these instructions using the Chlorhexidine cloths you've been given.  These steps reduce bacteria on the body.  Do not use the cloths near your eyes, ears mouth, genitalia or on open wounds.  Throw the cloths away after use but do not try to flush them down a toilet.      • Open and remove one cloth at a time from the package.    • Leave the cloth unfolded and begin the bathing.  • Massage the skin with the cloths using gentle pressure to remove bacteria.  Do not scrub harshly.   • Follow the steps below with one 2% CHG cloth per area (6 total cloths).  • One cloth for neck, shoulders and chest.  • One cloth for both arms, hands, fingers and underarms (do underarms last).  • One cloth for the abdomen followed by groin.  • One cloth for right leg and foot including between the toes.  • One cloth for left leg and foot including between the toes.  • The last cloth is to be used for the back of the neck, back and buttocks.    Allow the CHG to air dry 3 minutes on the skin which will give it time to work and decrease the chance of irritation.  The skin may feel sticky until it is dry.  Do not rinse with water or any other liquid or you will lose the beneficial effects of the CHG.  If mild skin irritation occurs, do rinse the skin to remove the CHG.  Report this to the nurse at time of admission.  Do not apply lotions, creams, ointments, deodorants or perfumes after using the clothes. Dress in clean clothes before coming to the hospital.    BACTROBAN NASAL OINTMENT  There are many germs normally in your nose. Bactroban is an ointment that will help reduce these germs. Please follow these instructions for Bactroban use:      ____The day before surgery in the morning  Date________    ____The day before surgery in the evening              Date________    ____The day of surgery in the morning    Date________    **Squirt ½ package of Bactroban Ointment onto a  cotton applicator and apply to inside of 1st nostril.  Squirt the remaining Bactroban and apply to the inside of the other nostril.        Take the following medications the morning of surgery:  NONE      ARRIVAL TIME TIME TO BE GIVEN TO YOU BY DR. NGUYEN'S OFFICE      General Instructions:  • Do not eat solid food after midnight the night before surgery.  • You may drink clear liquids day of surgery but must stop at least one hour before your hospital arrival time.  • It is beneficial for you to have a clear drink that contains carbohydrates the day of surgery.  We suggest a 12 to 20 ounce bottle of Gatorade or Powerade for non-diabetic patients or a 12 to 20 ounce bottle of G2 or Powerade Zero for diabetic patients. (Pediatric patients, are not advised to drink a 12 to 20 ounce carbohydrate drink)    Clear liquids are liquids you can see through.  Nothing red in color.     Plain water                               Sports drinks  Sodas                                   Gelatin (Jell-O)  Fruit juices without pulp such as white grape juice and apple juice  Popsicles that contain no fruit or yogurt  Tea or coffee (no cream or milk added)  Gatorade / Powerade  G2 / Powerade Zero    • Infants may have breast milk up to four hours before surgery.  • Infants drinking formula may drink formula up to six hours before surgery.   • Patients who avoid smoking, chewing tobacco and alcohol for 4 weeks prior to surgery have a reduced risk of post-operative complications.  Quit smoking as many days before surgery as you can.  • Do not smoke, use chewing tobacco or drink alcohol the day of surgery.   • If applicable bring your C-PAP/ BI-PAP machine.  • Bring any papers given to you in the doctor’s office.  • Wear clean comfortable clothes.  • Do not wear contact lenses, false eyelashes or make-up.  Bring a case for your glasses.   • Bring crutches or walker if applicable.  • Remove all piercings.  Leave jewelry and any other  valuables at home.  • Hair extensions with metal clips must be removed prior to surgery.  • The Pre-Admission Testing nurse will instruct you to bring medications if unable to obtain an accurate list in Pre-Admission Testing.            Preventing a Surgical Site Infection:  • For 2 to 3 days before surgery, avoid shaving with a razor because the razor can irritate skin and make it easier to develop an infection.    • Any areas of open skin can increase the risk of a post-operative wound infection by allowing bacteria to enter and travel throughout the body.  Notify your surgeon if you have any skin wounds / rashes even if it is not near the expected surgical site.  The area will need assessed to determine if surgery should be delayed until it is healed.  • The night prior to surgery shower using a fresh bar of anti-bacterial soap (such as Dial) and clean washcloth.  Sleep in a clean bed with clean clothing.  Do not allow pets to sleep with you.  • Shower on the morning of surgery using a fresh bar of anti-bacterial soap (such as Dial) and clean washcloth.  Dry with a clean towel and dress in clean clothing.  • Ask your surgeon if you will be receiving antibiotics prior to surgery.  • Make sure you, your family, and all healthcare providers clean their hands with soap and water or an alcohol based hand  before caring for you or your wound.    Day of surgery:  Your arrival time is approximately two hours before your scheduled surgery time.  Upon arrival, a Pre-op nurse and Anesthesiologist will review your health history, obtain vital signs, and answer questions you may have.  The only belongings needed at this time will be a list of your home medications and if applicable your C-PAP/BI-PAP machine.  If you are staying overnight your family can leave the rest of your belongings in the car and bring them to your room later.  A Pre-op nurse will start an IV and you may receive medication in preparation for  surgery, including something to help you relax.  Your family will be able to see you in the Pre-op area.  Two visitors at a time will be allowed in the Pre-op room.  While you are in surgery your family should notify the waiting room  if they leave the waiting room area and provide a contact phone number.    Please be aware that surgery does come with discomfort.  We want to make every effort to control your discomfort so please discuss any uncontrolled symptoms with your nurse.   Your doctor will most likely have prescribed pain medications.      If you are going home after surgery you will receive individualized written care instructions before being discharged.  A responsible adult must drive you to and from the hospital on the day of your surgery and stay with you for 24 hours.    If you are staying overnight following surgery, you will be transported to your hospital room following the recovery period.  [facility] has all private rooms.    If you have any questions please call Pre-Admission Testing at [phone].  Deductibles and co-payments are collected on the day of service. Please be prepared to pay the required co-pay, deductible or deposit on the day of service as defined by your plan.    Patient Education for Self-Quarantine Process    Following your COVID testing, we strongly recommend that you do not leave your home after you have been tested for COVID except to get medical care. This includes not going to work, school or to public areas.  If this is not possible for you to do please limit your activities to only required outings.  Be sure to wear a mask when you are with other people, practice social distancing and wash your hands frequently.      The following items provide additional details to keep you safe.  • Wash your hands with soap and water frequently for at least 20 seconds.   • Avoid touching your eyes, nose and mouth with unwashed hands.  • Do not share  anything - utensils, towels, food from the same bowl.   • Have your own utensils, drinking glass, dishes, towels and bedding.   • Do not have visitors.   • Do use FaceTime to stay in touch with family and friends.  • You should stay in a specific room away from others if possible.   • Stay at least 6 feet away from others in the home if you cannot have a dedicated room to yourself.   • Do not snuggle with your pet. While the CDC says there is no evidence that pets can spread COVID-19 or be infected from humans, it is probably best to avoid “petting, snuggling, being kissed or licked and sharing food (during self-quarantine)”, according to the CDC.   • Sanitize household surfaces daily. Include all high touch areas (door handles, light switches, phones, countertops, etc.)  • Do not share a bathroom with others, if possible.   • Wear a mask around others in your home if you are unable to stay in a separate room or 6 feet apart. If  you are unable to wear a mask, have your family member wear a mask if they must be within 6 feet of you.   Call your surgeon immediately if you experience any of the following symptoms:  • Sore Throat  • Shortness of Breath or difficulty breathing  • Cough  • Chills  • Body soreness or muscle pain  • Headache  • Fever  • New loss of taste or smell  • Do not arrive for your surgery ill.  Your procedure will need to be rescheduled to another time.  You will need to call your physician before the day of surgery to avoid any unnecessary exposure to hospital staff as well as other patients.

## 2020-07-02 NOTE — H&P
Patient: Terri Garrett    Date of Admission: 7/6/2020    YOB: 1940    Medical Record Number: [id number]    Admitting Physician: Dr. Arina Bradley    Reason for Admission: End Stage Right Knee OA    History of Present Illness: 79 y.o. female presents with severe end stage knee osteoarthritis which has not been responsive to the full compliment of conservative measures. Despite conservative attempts, there is still severe, constant activity limiting pain. Given the severity of the pain, the patient has elected to proceed with knee replacement.    Allergies:   Allergies   Allergen Reactions   • Chocolate Itching   • Citrus Itching   • Codeine Nausea Only   • Other Itching     Balsam of [place] - causes contact dermatitis   • Pneumococcal Polysaccharide Vaccine Rash     BALSAM OF [place] CONTAINS FRAGRANCES AND FLAVORS IN SEVERAL BAKED GOODS AND ICE CREAMS   • Pneumococcal Vaccines Swelling and Rash         Current Medications:  Home Medications:    Current Outpatient Medications on File Prior to Visit   Medication Sig   • acetaminophen (TYLENOL) 650 MG 8 hr tablet Take 650 mg by mouth Daily.   • calcium carbonate, oyster shell, 500 MG tablet tablet Take 500 mg by mouth 2 (Two) Times a Day.   • Chlorhexidine Gluconate Cloth 2 % pads Apply  topically Take As Directed. PRIOR TO SURGERY   • Chlorpheniramine Maleate ER (CHLOR-TRIMETON ALLERGY) 12 MG tablet controlled-release Take  by mouth Daily.   • Glucosamine-Chondroit-Vit C-Mn (GLUCOSAMINE CHONDR 1500 COMPLX PO) Take 1 tablet by mouth 2 (two) times a day. HOLD FOR SURGERY   • losartan (COZAAR) 100 MG tablet TAKE 1 TABLET DAILY (NEED APPOINTMENT WITH NEW DOCTOR BEFORE THIS PRESCRIPTION RUNS OUT )   • meloxicam (MOBIC) 15 MG tablet 1 PO Daily with food. (Patient taking differently: HELD FOR SURGERY)   • Multiple Vitamins-Minerals (MULTI FOR HER 50+ PO) Take  by mouth Daily. HOLD FOR SURGERY   • mupirocin (BACTROBAN) 2 % nasal ointment into the nostril(s) as  directed by provider Take As Directed. PRIOR TO SURGERY   • [DISCONTINUED] aspirin 81 MG EC tablet Take  by mouth daily.   • [DISCONTINUED] bacitracin 500 UNIT/GM ointment Apply  topically.   • [DISCONTINUED] calcium carbonate (Antacid Calcium) 500 MG chewable tablet Chew.   • [DISCONTINUED] Calcium Carbonate (CALCIUM CHEW) 500 MG chewable tablet Chew daily.   • [DISCONTINUED] clobetasol (TEMOVATE) 0.05 % ointment Apply 1 inch topically to the appropriate area as directed As Needed.   • [DISCONTINUED] estradiol (ESTRACE) 0.1 MG/GM vaginal cream      Current Facility-Administered Medications on File Prior to Visit   Medication   • Chlorhexidine Gluconate 2 % pads 2 each   • mupirocin (BACTROBAN) 2 % nasal ointment     PRN Meds:.    PMH:     Past Medical History:   Diagnosis Date   • Allergic reaction     FOOD FLAVORINGS AND ADDITIVES   • Arthritis    • Breast cancer (CMS/HCC)    • Hard of hearing     BILATERAL AIDS   • History of foot fracture     LEFT   • Hypertension    • Insomnia    • Knee pain     RIGHT   • Spinal headache     S/P SPINAL BLOCK W/CHILDBIRTH       PF/Surg/Soc Hx:     Past Surgical History:   Procedure Laterality Date   • BREAST LUMPECTOMY Left     RADIATION   • CATARACT EXTRACTION Bilateral    • KNEE SURGERY Right     menscal   • LAPAROTOMY OOPHERECTOMY Left    • REFRACTIVE SURGERY Bilateral     SCAR TISSUE S/P CATARACTS SURGERY   • SENTINEL LYMPH NODE BIOPSY Left             Social History     Occupational History   • Not on file   Tobacco Use   • Smoking status: Former Smoker     Packs/day: 1.00     Years: 15.00     Pack years: 15.00     Last attempt to quit: 1976     Years since quittin.8   • Smokeless tobacco: Never Used   Substance and Sexual Activity   • Alcohol use: Yes     Alcohol/week: 7.0 standard drinks     Types: 7 Glasses of wine per week   • Drug use: Never   • Sexual activity: Not on file      Social History     Social History Narrative   • Not on file       "  Family History   Problem Relation Age of Onset   • Cancer Mother         breast   • Malig Hyperthermia Neg Hx          Review of Systems:   A 14 point review of systems was performed, pertinent positives discussed above, all other systems are negative    Physical Exam: 79 y.o. female  Vital Signs :   Vitals:    07/02/20 1413   BP: 130/82   Temp: 98.1 °F (36.7 °C)   Weight: 74.8 kg (165 lb)   Height: 152.4 cm (60\")   PainSc:   5     General: Alert and Oriented x 3, No acute distress.  Psych: mood and affect appropriate; recent and remote memory intact  Eyes: conjunctiva clear; pupils equally round and reactive, sclera nonicteric  CV: no peripheral edema  Resp: normal respiratory effort  Skin: no rashes or wounds; normal turgor  Musculosketetal; pain and crepitance with knee range of motion  Vascular: palpable distal pulses    Xrays:  -3 views (AP, lateral, and sunrise) were reviewed demonstrating end-stage OA with bone on bone articulation.    Assessment:  End-stage Right knee osteoarthritis. Conservative measures have failed.      Plan:  The plan is to proceed with Right Total Knee Replacement. The patient voiced understanding of the risks, benefits, and alternative forms of treatment that were discussed with Dr Bradley at the time of scheduling. 23     Tawny Metcalf, APRN  7/2/2020        "

## 2020-07-03 ENCOUNTER — LAB (OUTPATIENT)
Dept: LAB | Facility: HOSPITAL | Age: 80
End: 2020-07-03

## 2020-07-03 DIAGNOSIS — Z01.818 OTHER SPECIFIED PRE-OPERATIVE EXAMINATION: ICD-10-CM

## 2020-07-03 LAB — BACTERIA SPEC AEROBE CULT: NORMAL

## 2020-07-03 PROCEDURE — C9803 HOPD COVID-19 SPEC COLLECT: HCPCS

## 2020-07-03 PROCEDURE — U0004 COV-19 TEST NON-CDC HGH THRU: HCPCS

## 2020-07-04 LAB
REF LAB TEST METHOD: NORMAL
SARS-COV-2 RNA RESP QL NAA+PROBE: NOT DETECTED

## 2020-07-06 ENCOUNTER — ANESTHESIA EVENT (OUTPATIENT)
Dept: PERIOP | Facility: HOSPITAL | Age: 80
End: 2020-07-06

## 2020-07-06 ENCOUNTER — APPOINTMENT (OUTPATIENT)
Dept: GENERAL RADIOLOGY | Facility: HOSPITAL | Age: 80
End: 2020-07-06

## 2020-07-06 ENCOUNTER — ANESTHESIA (OUTPATIENT)
Dept: PERIOP | Facility: HOSPITAL | Age: 80
End: 2020-07-06

## 2020-07-06 ENCOUNTER — HOSPITAL ENCOUNTER (OUTPATIENT)
Facility: HOSPITAL | Age: 80
Discharge: HOME HEALTH | End: 2020-07-07
Attending: ORTHOPAEDIC SURGERY | Admitting: ORTHOPAEDIC SURGERY

## 2020-07-06 DIAGNOSIS — M17.11 PRIMARY OSTEOARTHRITIS OF RIGHT KNEE: ICD-10-CM

## 2020-07-06 PROCEDURE — 27447 TOTAL KNEE ARTHROPLASTY: CPT | Performed by: NURSE PRACTITIONER

## 2020-07-06 PROCEDURE — 25010000003 CEFAZOLIN IN DEXTROSE 2-4 GM/100ML-% SOLUTION: Performed by: NURSE PRACTITIONER

## 2020-07-06 PROCEDURE — C1713 ANCHOR/SCREW BN/BN,TIS/BN: HCPCS | Performed by: ORTHOPAEDIC SURGERY

## 2020-07-06 PROCEDURE — 25010000002 FENTANYL CITRATE (PF) 100 MCG/2ML SOLUTION: Performed by: NURSE ANESTHETIST, CERTIFIED REGISTERED

## 2020-07-06 PROCEDURE — 25010000002 VANCOMYCIN 10 G RECONSTITUTED SOLUTION: Performed by: ORTHOPAEDIC SURGERY

## 2020-07-06 PROCEDURE — 25010000002 KETOROLAC TROMETHAMINE PER 15 MG: Performed by: NURSE PRACTITIONER

## 2020-07-06 PROCEDURE — 25010000003 BUPIVACAINE LIPOSOME 1.3 % SUSPENSION 20 ML VIAL: Performed by: ORTHOPAEDIC SURGERY

## 2020-07-06 PROCEDURE — 25010000002 PHENYLEPHRINE PER 1 ML: Performed by: NURSE ANESTHETIST, CERTIFIED REGISTERED

## 2020-07-06 PROCEDURE — 73560 X-RAY EXAM OF KNEE 1 OR 2: CPT

## 2020-07-06 PROCEDURE — 97161 PT EVAL LOW COMPLEX 20 MIN: CPT

## 2020-07-06 PROCEDURE — 25010000003 CEFAZOLIN IN DEXTROSE 2-4 GM/100ML-% SOLUTION: Performed by: ORTHOPAEDIC SURGERY

## 2020-07-06 PROCEDURE — 25010000002 PROPOFOL 10 MG/ML EMULSION: Performed by: NURSE ANESTHETIST, CERTIFIED REGISTERED

## 2020-07-06 PROCEDURE — C1776 JOINT DEVICE (IMPLANTABLE): HCPCS | Performed by: ORTHOPAEDIC SURGERY

## 2020-07-06 PROCEDURE — C9290 INJ, BUPIVACAINE LIPOSOME: HCPCS | Performed by: ORTHOPAEDIC SURGERY

## 2020-07-06 PROCEDURE — 97110 THERAPEUTIC EXERCISES: CPT

## 2020-07-06 PROCEDURE — 25010000002 ONDANSETRON PER 1 MG: Performed by: NURSE ANESTHETIST, CERTIFIED REGISTERED

## 2020-07-06 PROCEDURE — 27447 TOTAL KNEE ARTHROPLASTY: CPT | Performed by: ORTHOPAEDIC SURGERY

## 2020-07-06 PROCEDURE — 25010000002 DEXAMETHASONE PER 1 MG: Performed by: NURSE ANESTHETIST, CERTIFIED REGISTERED

## 2020-07-06 DEVICE — PALACOS® R IS A FAST-CURING, RADIOPAQUE, POLY(METHYL METHACRYLATE)-BASED BONE CEMENT.PALACOS ® R CONTAINS THE X-RAY CONTRAST MEDIUM ZIRCONIUM DIOXIDE. TO IMPROVE VISIBILITY IN THE SURGICAL FIELD PALACOS ® R HAS BEEN COLOURED WITH CHLOROPHYLL (E141). THE BONE CEMENT IS PREPARED DIRECTLY BEFORE USE BY MIXING A POLYMER POWDER COMPONENT WITH A LIQUID MONOMER COMPONENT. A DUCTILE DOUGH FORMS WHICH CURES WITHIN A FEW MINUTES.
Type: IMPLANTABLE DEVICE | Site: KNEE | Status: FUNCTIONAL
Brand: PALACOS®

## 2020-07-06 DEVICE — DEV CONTRL TISS STRATAFIX SPIRAL MNCRYL UD 3/0 PLS 30CM: Type: IMPLANTABLE DEVICE | Site: KNEE | Status: FUNCTIONAL

## 2020-07-06 DEVICE — LEGION CRUCIATE RETAINING OXINIUM                                    FEMORAL SIZE 5 RIGHT
Type: IMPLANTABLE DEVICE | Site: KNEE | Status: FUNCTIONAL
Brand: LEGION

## 2020-07-06 DEVICE — GENESIS II BICONVEX PATELLA 29MM
Type: IMPLANTABLE DEVICE | Site: KNEE | Status: FUNCTIONAL
Brand: GENESIS II

## 2020-07-06 DEVICE — GENESIS II NON-POROUS TIBIAL                                    BASEPLATE SIZE 6 RIGHT
Type: IMPLANTABLE DEVICE | Site: KNEE | Status: FUNCTIONAL
Brand: GENESIS II

## 2020-07-06 DEVICE — IMPLANTABLE DEVICE: Type: IMPLANTABLE DEVICE | Site: KNEE | Status: FUNCTIONAL

## 2020-07-06 DEVICE — DEV CONTRL TISS STRATAFIX SYMM PDS PLUS VIL CT-1 60CM: Type: IMPLANTABLE DEVICE | Site: KNEE | Status: FUNCTIONAL

## 2020-07-06 DEVICE — LEGION CRUCIATE RETAINING HIGH                                    FLEX HIGHLY CROSS LINKED                                    POLYETHYLENE SIZE 5-6 11MM
Type: IMPLANTABLE DEVICE | Site: KNEE | Status: FUNCTIONAL
Brand: LEGION

## 2020-07-06 RX ORDER — LIDOCAINE HYDROCHLORIDE 20 MG/ML
INJECTION, SOLUTION INFILTRATION; PERINEURAL AS NEEDED
Status: DISCONTINUED | OUTPATIENT
Start: 2020-07-06 | End: 2020-07-06 | Stop reason: SURG

## 2020-07-06 RX ORDER — LIDOCAINE HYDROCHLORIDE 10 MG/ML
0.5 INJECTION, SOLUTION EPIDURAL; INFILTRATION; INTRACAUDAL; PERINEURAL ONCE AS NEEDED
Status: DISCONTINUED | OUTPATIENT
Start: 2020-07-06 | End: 2020-07-06 | Stop reason: HOSPADM

## 2020-07-06 RX ORDER — ASPIRIN 81 MG/1
81 TABLET ORAL EVERY 12 HOURS SCHEDULED
Status: DISCONTINUED | OUTPATIENT
Start: 2020-07-07 | End: 2020-07-07 | Stop reason: HOSPADM

## 2020-07-06 RX ORDER — TRANEXAMIC ACID 100 MG/ML
INJECTION, SOLUTION INTRAVENOUS AS NEEDED
Status: DISCONTINUED | OUTPATIENT
Start: 2020-07-06 | End: 2020-07-06 | Stop reason: SURG

## 2020-07-06 RX ORDER — FAMOTIDINE 10 MG/ML
20 INJECTION, SOLUTION INTRAVENOUS ONCE
Status: COMPLETED | OUTPATIENT
Start: 2020-07-06 | End: 2020-07-06

## 2020-07-06 RX ORDER — CEFAZOLIN SODIUM 2 G/100ML
2 INJECTION, SOLUTION INTRAVENOUS ONCE
Status: COMPLETED | OUTPATIENT
Start: 2020-07-06 | End: 2020-07-06

## 2020-07-06 RX ORDER — EPHEDRINE SULFATE 50 MG/ML
5 INJECTION, SOLUTION INTRAVENOUS ONCE AS NEEDED
Status: DISCONTINUED | OUTPATIENT
Start: 2020-07-06 | End: 2020-07-06 | Stop reason: HOSPADM

## 2020-07-06 RX ORDER — PREGABALIN 75 MG/1
150 CAPSULE ORAL ONCE
Status: COMPLETED | OUTPATIENT
Start: 2020-07-06 | End: 2020-07-06

## 2020-07-06 RX ORDER — MIDAZOLAM HYDROCHLORIDE 1 MG/ML
1 INJECTION INTRAMUSCULAR; INTRAVENOUS
Status: DISCONTINUED | OUTPATIENT
Start: 2020-07-06 | End: 2020-07-06 | Stop reason: HOSPADM

## 2020-07-06 RX ORDER — ACETAMINOPHEN 325 MG/1
650 TABLET ORAL ONCE AS NEEDED
Status: DISCONTINUED | OUTPATIENT
Start: 2020-07-06 | End: 2020-07-06 | Stop reason: HOSPADM

## 2020-07-06 RX ORDER — HYDROMORPHONE HYDROCHLORIDE 1 MG/ML
0.5 INJECTION, SOLUTION INTRAMUSCULAR; INTRAVENOUS; SUBCUTANEOUS
Status: DISCONTINUED | OUTPATIENT
Start: 2020-07-06 | End: 2020-07-06 | Stop reason: HOSPADM

## 2020-07-06 RX ORDER — MELOXICAM 15 MG/1
15 TABLET ORAL ONCE
Status: COMPLETED | OUTPATIENT
Start: 2020-07-06 | End: 2020-07-06

## 2020-07-06 RX ORDER — HYDRALAZINE HYDROCHLORIDE 20 MG/ML
5 INJECTION INTRAMUSCULAR; INTRAVENOUS
Status: DISCONTINUED | OUTPATIENT
Start: 2020-07-06 | End: 2020-07-06 | Stop reason: HOSPADM

## 2020-07-06 RX ORDER — SODIUM CHLORIDE 0.9 % (FLUSH) 0.9 %
3 SYRINGE (ML) INJECTION EVERY 12 HOURS SCHEDULED
Status: DISCONTINUED | OUTPATIENT
Start: 2020-07-06 | End: 2020-07-06 | Stop reason: HOSPADM

## 2020-07-06 RX ORDER — BUPIVACAINE HYDROCHLORIDE 7.5 MG/ML
INJECTION, SOLUTION EPIDURAL; RETROBULBAR
Status: COMPLETED | OUTPATIENT
Start: 2020-07-06 | End: 2020-07-06

## 2020-07-06 RX ORDER — ACETAMINOPHEN 10 MG/ML
1000 INJECTION, SOLUTION INTRAVENOUS ONCE
Status: COMPLETED | OUTPATIENT
Start: 2020-07-06 | End: 2020-07-06

## 2020-07-06 RX ORDER — OXYCODONE AND ACETAMINOPHEN 7.5; 325 MG/1; MG/1
1 TABLET ORAL ONCE AS NEEDED
Status: DISCONTINUED | OUTPATIENT
Start: 2020-07-06 | End: 2020-07-06 | Stop reason: HOSPADM

## 2020-07-06 RX ORDER — HYDROCODONE BITARTRATE AND ACETAMINOPHEN 7.5; 325 MG/1; MG/1
1 TABLET ORAL EVERY 4 HOURS PRN
Status: DISCONTINUED | OUTPATIENT
Start: 2020-07-06 | End: 2020-07-07 | Stop reason: HOSPADM

## 2020-07-06 RX ORDER — NALOXONE HCL 0.4 MG/ML
0.2 VIAL (ML) INJECTION AS NEEDED
Status: DISCONTINUED | OUTPATIENT
Start: 2020-07-06 | End: 2020-07-06 | Stop reason: HOSPADM

## 2020-07-06 RX ORDER — DEXAMETHASONE SODIUM PHOSPHATE 4 MG/ML
INJECTION, SOLUTION INTRA-ARTICULAR; INTRALESIONAL; INTRAMUSCULAR; INTRAVENOUS; SOFT TISSUE AS NEEDED
Status: DISCONTINUED | OUTPATIENT
Start: 2020-07-06 | End: 2020-07-06 | Stop reason: SURG

## 2020-07-06 RX ORDER — FENTANYL CITRATE 50 UG/ML
50 INJECTION, SOLUTION INTRAMUSCULAR; INTRAVENOUS
Status: DISCONTINUED | OUTPATIENT
Start: 2020-07-06 | End: 2020-07-06 | Stop reason: HOSPADM

## 2020-07-06 RX ORDER — DIPHENHYDRAMINE HCL 25 MG
25 CAPSULE ORAL
Status: DISCONTINUED | OUTPATIENT
Start: 2020-07-06 | End: 2020-07-06 | Stop reason: HOSPADM

## 2020-07-06 RX ORDER — HYDROCODONE BITARTRATE AND ACETAMINOPHEN 7.5; 325 MG/1; MG/1
1 TABLET ORAL ONCE AS NEEDED
Status: DISCONTINUED | OUTPATIENT
Start: 2020-07-06 | End: 2020-07-06 | Stop reason: HOSPADM

## 2020-07-06 RX ORDER — WOUND DRESSING ADHESIVE - LIQUID
LIQUID MISCELLANEOUS AS NEEDED
Status: DISCONTINUED | OUTPATIENT
Start: 2020-07-06 | End: 2020-07-06 | Stop reason: HOSPADM

## 2020-07-06 RX ORDER — FENTANYL CITRATE 50 UG/ML
INJECTION, SOLUTION INTRAMUSCULAR; INTRAVENOUS AS NEEDED
Status: DISCONTINUED | OUTPATIENT
Start: 2020-07-06 | End: 2020-07-06 | Stop reason: SURG

## 2020-07-06 RX ORDER — ONDANSETRON 2 MG/ML
4 INJECTION INTRAMUSCULAR; INTRAVENOUS ONCE AS NEEDED
Status: DISCONTINUED | OUTPATIENT
Start: 2020-07-06 | End: 2020-07-06 | Stop reason: HOSPADM

## 2020-07-06 RX ORDER — LOSARTAN POTASSIUM 100 MG/1
100 TABLET ORAL
Status: DISCONTINUED | OUTPATIENT
Start: 2020-07-06 | End: 2020-07-07 | Stop reason: HOSPADM

## 2020-07-06 RX ORDER — ONDANSETRON 4 MG/1
4 TABLET, FILM COATED ORAL EVERY 6 HOURS PRN
Status: DISCONTINUED | OUTPATIENT
Start: 2020-07-06 | End: 2020-07-07 | Stop reason: HOSPADM

## 2020-07-06 RX ORDER — SODIUM CHLORIDE 0.9 % (FLUSH) 0.9 %
3-10 SYRINGE (ML) INJECTION AS NEEDED
Status: DISCONTINUED | OUTPATIENT
Start: 2020-07-06 | End: 2020-07-06 | Stop reason: HOSPADM

## 2020-07-06 RX ORDER — MELOXICAM 7.5 MG/1
7.5 TABLET ORAL DAILY
Status: DISCONTINUED | OUTPATIENT
Start: 2020-07-07 | End: 2020-07-07 | Stop reason: HOSPADM

## 2020-07-06 RX ORDER — FLUMAZENIL 0.1 MG/ML
0.2 INJECTION INTRAVENOUS AS NEEDED
Status: DISCONTINUED | OUTPATIENT
Start: 2020-07-06 | End: 2020-07-06 | Stop reason: HOSPADM

## 2020-07-06 RX ORDER — KETOROLAC TROMETHAMINE 15 MG/ML
15 INJECTION, SOLUTION INTRAMUSCULAR; INTRAVENOUS EVERY 8 HOURS
Status: DISCONTINUED | OUTPATIENT
Start: 2020-07-06 | End: 2020-07-07 | Stop reason: HOSPADM

## 2020-07-06 RX ORDER — UREA 10 %
3 LOTION (ML) TOPICAL NIGHTLY PRN
Status: DISCONTINUED | OUTPATIENT
Start: 2020-07-06 | End: 2020-07-07 | Stop reason: HOSPADM

## 2020-07-06 RX ORDER — PROMETHAZINE HYDROCHLORIDE 25 MG/1
25 SUPPOSITORY RECTAL ONCE AS NEEDED
Status: DISCONTINUED | OUTPATIENT
Start: 2020-07-06 | End: 2020-07-06 | Stop reason: HOSPADM

## 2020-07-06 RX ORDER — PROMETHAZINE HYDROCHLORIDE 25 MG/1
25 TABLET ORAL ONCE AS NEEDED
Status: DISCONTINUED | OUTPATIENT
Start: 2020-07-06 | End: 2020-07-06 | Stop reason: HOSPADM

## 2020-07-06 RX ORDER — CEFAZOLIN SODIUM 2 G/100ML
2 INJECTION, SOLUTION INTRAVENOUS EVERY 8 HOURS
Status: COMPLETED | OUTPATIENT
Start: 2020-07-06 | End: 2020-07-07

## 2020-07-06 RX ORDER — MAGNESIUM HYDROXIDE 1200 MG/15ML
LIQUID ORAL AS NEEDED
Status: DISCONTINUED | OUTPATIENT
Start: 2020-07-06 | End: 2020-07-06 | Stop reason: HOSPADM

## 2020-07-06 RX ORDER — PANTOPRAZOLE SODIUM 40 MG/1
40 TABLET, DELAYED RELEASE ORAL EVERY MORNING
Status: DISCONTINUED | OUTPATIENT
Start: 2020-07-07 | End: 2020-07-07 | Stop reason: HOSPADM

## 2020-07-06 RX ORDER — LABETALOL HYDROCHLORIDE 5 MG/ML
5 INJECTION, SOLUTION INTRAVENOUS
Status: DISCONTINUED | OUTPATIENT
Start: 2020-07-06 | End: 2020-07-06 | Stop reason: HOSPADM

## 2020-07-06 RX ORDER — HYDROCODONE BITARTRATE AND ACETAMINOPHEN 7.5; 325 MG/1; MG/1
2 TABLET ORAL EVERY 4 HOURS PRN
Status: DISCONTINUED | OUTPATIENT
Start: 2020-07-06 | End: 2020-07-07 | Stop reason: HOSPADM

## 2020-07-06 RX ORDER — PROMETHAZINE HYDROCHLORIDE 25 MG/ML
12.5 INJECTION, SOLUTION INTRAMUSCULAR; INTRAVENOUS ONCE AS NEEDED
Status: DISCONTINUED | OUTPATIENT
Start: 2020-07-06 | End: 2020-07-06 | Stop reason: HOSPADM

## 2020-07-06 RX ORDER — PROPOFOL 10 MG/ML
VIAL (ML) INTRAVENOUS AS NEEDED
Status: DISCONTINUED | OUTPATIENT
Start: 2020-07-06 | End: 2020-07-06 | Stop reason: SURG

## 2020-07-06 RX ORDER — PROMETHAZINE HYDROCHLORIDE 25 MG/ML
6.25 INJECTION, SOLUTION INTRAMUSCULAR; INTRAVENOUS
Status: DISCONTINUED | OUTPATIENT
Start: 2020-07-06 | End: 2020-07-06 | Stop reason: HOSPADM

## 2020-07-06 RX ORDER — SODIUM CHLORIDE, SODIUM LACTATE, POTASSIUM CHLORIDE, CALCIUM CHLORIDE 600; 310; 30; 20 MG/100ML; MG/100ML; MG/100ML; MG/100ML
9 INJECTION, SOLUTION INTRAVENOUS CONTINUOUS
Status: DISCONTINUED | OUTPATIENT
Start: 2020-07-06 | End: 2020-07-06 | Stop reason: HOSPADM

## 2020-07-06 RX ORDER — ONDANSETRON 2 MG/ML
INJECTION INTRAMUSCULAR; INTRAVENOUS AS NEEDED
Status: DISCONTINUED | OUTPATIENT
Start: 2020-07-06 | End: 2020-07-06 | Stop reason: SURG

## 2020-07-06 RX ORDER — DIPHENHYDRAMINE HYDROCHLORIDE 50 MG/ML
12.5 INJECTION INTRAMUSCULAR; INTRAVENOUS
Status: DISCONTINUED | OUTPATIENT
Start: 2020-07-06 | End: 2020-07-06 | Stop reason: HOSPADM

## 2020-07-06 RX ORDER — ONDANSETRON 2 MG/ML
4 INJECTION INTRAMUSCULAR; INTRAVENOUS EVERY 6 HOURS PRN
Status: DISCONTINUED | OUTPATIENT
Start: 2020-07-06 | End: 2020-07-07 | Stop reason: HOSPADM

## 2020-07-06 RX ADMIN — FAMOTIDINE 20 MG: 10 INJECTION INTRAVENOUS at 12:03

## 2020-07-06 RX ADMIN — SODIUM CHLORIDE, POTASSIUM CHLORIDE, SODIUM LACTATE AND CALCIUM CHLORIDE 9 ML/HR: 600; 310; 30; 20 INJECTION, SOLUTION INTRAVENOUS at 11:58

## 2020-07-06 RX ADMIN — PHENYLEPHRINE HYDROCHLORIDE 100 MCG: 10 INJECTION INTRAVENOUS at 12:57

## 2020-07-06 RX ADMIN — LIDOCAINE HYDROCHLORIDE 60 MG: 20 INJECTION, SOLUTION INFILTRATION; PERINEURAL at 12:35

## 2020-07-06 RX ADMIN — TRANEXAMIC ACID 1000 MG: 100 INJECTION, SOLUTION INTRAVENOUS at 13:31

## 2020-07-06 RX ADMIN — KETOROLAC TROMETHAMINE 15 MG: 15 INJECTION, SOLUTION INTRAMUSCULAR; INTRAVENOUS at 16:29

## 2020-07-06 RX ADMIN — BUPIVACAINE HYDROCHLORIDE 1.2 ML: 7.5 INJECTION, SOLUTION EPIDURAL; RETROBULBAR at 12:28

## 2020-07-06 RX ADMIN — CEFAZOLIN SODIUM 2 G: 2 INJECTION, SOLUTION INTRAVENOUS at 12:30

## 2020-07-06 RX ADMIN — HYDROCODONE BITARTRATE AND ACETAMINOPHEN 1 TABLET: 7.5; 325 TABLET ORAL at 21:41

## 2020-07-06 RX ADMIN — FENTANYL CITRATE 25 MCG: 50 INJECTION INTRAMUSCULAR; INTRAVENOUS at 12:25

## 2020-07-06 RX ADMIN — PROPOFOL 150 MG: 10 INJECTION, EMULSION INTRAVENOUS at 12:36

## 2020-07-06 RX ADMIN — ONDANSETRON HYDROCHLORIDE 4 MG: 2 SOLUTION INTRAMUSCULAR; INTRAVENOUS at 13:55

## 2020-07-06 RX ADMIN — PHENYLEPHRINE HYDROCHLORIDE 100 MCG: 10 INJECTION INTRAVENOUS at 13:47

## 2020-07-06 RX ADMIN — ACETAMINOPHEN 1000 MG: 10 INJECTION, SOLUTION INTRAVENOUS at 12:30

## 2020-07-06 RX ADMIN — MUPIROCIN 1 APPLICATION: 20 OINTMENT TOPICAL at 21:14

## 2020-07-06 RX ADMIN — CEFAZOLIN SODIUM 2 G: 2 INJECTION, SOLUTION INTRAVENOUS at 21:14

## 2020-07-06 RX ADMIN — MELOXICAM 15 MG: 15 TABLET ORAL at 11:28

## 2020-07-06 RX ADMIN — POLYETHYLENE GLYCOL 3350 17 G: 17 POWDER, FOR SOLUTION ORAL at 21:14

## 2020-07-06 RX ADMIN — DEXAMETHASONE SODIUM PHOSPHATE 8 MG: 4 INJECTION INTRA-ARTICULAR; INTRALESIONAL; INTRAMUSCULAR; INTRAVENOUS; SOFT TISSUE at 12:30

## 2020-07-06 RX ADMIN — PREGABALIN 150 MG: 75 CAPSULE ORAL at 11:28

## 2020-07-06 RX ADMIN — VANCOMYCIN HYDROCHLORIDE 1250 MG: 10 INJECTION, POWDER, LYOPHILIZED, FOR SOLUTION INTRAVENOUS at 11:24

## 2020-07-06 RX ADMIN — PHENYLEPHRINE HYDROCHLORIDE 100 MCG: 10 INJECTION INTRAVENOUS at 13:06

## 2020-07-06 NOTE — ANESTHESIA POSTPROCEDURE EVALUATION
Patient: Terri Garrett    Procedure Summary     Date:  07/06/20 Room / Location:  [facility] OR [address] [place] [address] MAIN OR    Anesthesia Start:  1219 Anesthesia Stop:  1407    Procedure:  TOTAL KNEE ARTHROPLASTY (Right Knee) Diagnosis:       Primary osteoarthritis of right knee      (Primary osteoarthritis of right knee [M17.11])    Surgeon:  Arian Bradley MD Provider:  Lc Long MD    Anesthesia Type:  spinal ASA Status:  2          Anesthesia Type: spinal    Vitals  Vitals Value Taken Time   /63 7/6/2020  2:05 PM   Temp     Pulse 77 7/6/2020  2:10 PM   Resp     SpO2 94 % 7/6/2020  2:10 PM   Vitals shown include unvalidated device data.        Post Anesthesia Care and Evaluation    Patient location during evaluation: PACU  Patient participation: complete - patient participated  Level of consciousness: awake and alert  Pain management: adequate  Airway patency: patent  Anesthetic complications: No anesthetic complications    Cardiovascular status: acceptable  Respiratory status: acceptable  Hydration status: acceptable    Comments: ---------------------------               07/06/20                      1126         ---------------------------   BP:          115/64         Pulse:         77           Resp:          18           Temp:   36.6 °C (97.9 °F)   SpO2:          98%         ---------------------------

## 2020-07-06 NOTE — PLAN OF CARE
Problem: Patient Care Overview  Goal: Plan of Care Review  Outcome: Ongoing (interventions implemented as appropriate)  Flowsheets (Taken 7/6/2020 6677)  Progress: improving  Plan of Care Reviewed With: patient  Outcome Summary: pt post op today or RTK. HV in place. ELAINE clean dry and intact. VSS. pt plans to d/c home with home health. Will continue to monitor.

## 2020-07-06 NOTE — OP NOTE
Name: Terri Garrett  YOB: 1940    DATE OF SURGERY: 7/6/2020    PREOPERATIVE DIAGNOSIS: Right knee end-stage osteoarthritis    POSTOPERATIVE DIAGNOSIS: Right knee end-stage osteoarthritis    PROCEDURE PERFORMED: Right total knee replacement    SURGEON: Arian Bradley M.D.    ASSISTANT: JAHAIRA HUGGINS    IMPLANTS: [employer]:     Implant Name Type Inv. Item Serial No.  Lot No. LRB No. Used   CMT BONE PALACOS R HI/VISC 1X40 - [id number] Implant CMT BONE PALACOS R HI/VISC 1X40  [place] [facility] [id number] Right 2   SUT CONTRL TISS STRATAFIX SPIRAL MNCRYL UD 3/0 PLS 30CM - [id number] Implant SUT CONTRL TISS STRATAFIX SPIRAL MNCRYL UD 3/0 PLS 30CM  [employer]  DIV [employer] J AND J [id number] Right 1   SUT CONTRL TISS STRATAFIX SYMM PDS PLUS ANTONI CT-1 60CM - [id number] Implant SUT CONTRL TISS STRATAFIX SYMM PDS PLUS ANTONI CT-1 60CM  [employer]  DIV OF J AND J [id number] Right 1   BASE TIB/KN GEN2 NONPOR TI SZ6 RT - [id number] Implant BASE TIB/KN GEN2 NONPOR TI SZ6 RT  [employer] [id number] Right 1   COMP FEM LEGION OXINIUM CR SZ5 RT - [id number] Implant COMP FEM LEGION OXINIUM CR SZ5 RT  [employer] [id number] Right 1   PAT GEN2 BICONVEX 13R39AN - [id number] Implant PAT GEN2 BICONVEX [id number]  [employer] [id number] Right 1   INSRT ART LEGION CR HF XLPE SZ5TO6 11MM - [id number] Implant INSRT ART LEGION CR HF XLPE SZ5TO6 11MM  [employer] [id number] Right 1       Estimated Blood Loss: 200cc  Specimens : none  Complications: none    DESCRIPTION OF PROCEDURE: The patient was taken to the operating room and placed in the supine position. A sequential compression device was carefully placed on the non-operative leg. Preoperative antibiotics were administered. Surgical time out was performed. After adequate induction of anesthesia, the leg was prepped and draped in the usual sterile fashion, exsanguinated with an Esmarch bandage and the tourniquet inflated to 250 mmHg. A midline  incision was performed followed by a medial parapatellar arthrotomy. The patella was subluxed laterally.  A portion of the fat pad, ACL, and anterior horns of the meniscus were excised. The drill hole was placed in the distal femur and the canal was the irrigated and suctioned. The IM bebo was placed and a 5 degree distal valgus cut was performed on the femur. The femur was then sized with a sizing guide. The femoral cutting block was placed and all femoral cuts were performed. The proximal tibia was exposed. We used the extramedullary tibial cutting guide set for removal of 9mm of bone off the high side. The tibial cut was performed. The posterior horns of the menisci were excised. The posterior osteophytes were removed. Flexion extension blocks were then used to balance the knee. The tibial cut surface was then sized with the sizing templates and the tibial and femoral trial were then placed. The knee was placed in full extension and then the tibial tray rotation was then matched to the femoral rotation and marked.    Attention was then placed to the patella. The patella was noted to track centrally through range of motion. The patella was then sized with the trials. The thickness of the patella was then measured. The patella was resurfaced and the surrounding osteophytes were removed. The preoperative thickness was reproduced. The patella tracked centrally through range of motion.   At this point all trial components were removed, the knee was copiously irrigated with pulsed lavage, and the knee was injected with anesthetic cocktail solution. The cut surfaces were then dried with clean lap sponges, and the components were cemented tibia, followed by femur, then patella. The knee was held in full extension and all excess cement was removed. The knee was held still until the cement had completely hardened. We then placed the trial polyethylene spacer which resulted in full extension and excellent flexion-extension  balance. We placed the final polyethylene spacer.   The knee was then copiously irrigated. The tourniquet was then released. There was excellent hemostasis. We placed a one-eighth inch Hemovac drain. We closed the knee in multiple layers in standard fashion. Sterile dressing were applied. At the end of the case, the sponge and needle counts were reported as being correct. There were no known complications. The patient was then transported to the recovery room.      Arian Bradley M.D.

## 2020-07-06 NOTE — ANESTHESIA PROCEDURE NOTES
Spinal Block      Patient location during procedure: OR  Indication:at surgeon's request, post-op pain management and procedure for pain  Performed By  Anesthesiologist: Lc Long MD  CRNA: Ashanti Rene CRNA  Preanesthetic Checklist  Completed: patient identified, site marked, surgical consent, pre-op evaluation, timeout performed, IV checked, risks and benefits discussed and monitors and equipment checked  Spinal Block Prep:  Patient Position:sitting  Sterile Tech:gloves and mask  Prep:Chloraprep  Patient Monitoring:blood pressure monitoring, continuous pulse oximetry and EKG  Spinal Block Procedure  Approach:midline  Guidance:landmark technique and palpation technique  Location:L4-L5  Needle Type:Bria  Needle Gauge:25 G  Placement of Spinal needle event:cerebrospinal fluid aspirated  Paresthesia: no  Fluid Appearance:clear  Medications: bupivacaine PF (MARCAINE) 0.75 % injection, 1.2 mL  Med Administered at 7/6/2020 12:28 PM   Post Assessment  Patient Tolerance:patient tolerated the procedure well with no apparent complications  Complications no

## 2020-07-06 NOTE — THERAPY EVALUATION
Patient Name: Terri Garrett  : 1940    MRN: [id number]                              Today's Date: 2020       Admit Date: 2020    Visit Dx:     ICD-10-CM ICD-9-CM   1. Primary osteoarthritis of right knee M17.11 715.16     Patient Active Problem List   Diagnosis   • Primary osteoarthritis of right knee   • Intermittent knee pain   • Health care maintenance   • Hypertension   • Chronic pain of right knee   • Acute bilateral knee pain     Past Medical History:   Diagnosis Date   • Allergic reaction     FOOD FLAVORINGS AND ADDITIVES   • Arthritis    • Breast cancer (CMS/HCC)    • Hard of hearing     BILATERAL AIDS   • History of foot fracture     LEFT   • Hypertension    • Insomnia    • Knee pain     RIGHT   • Spinal headache     S/P SPINAL BLOCK W/CHILDBIRTH   • Vitiligo      Past Surgical History:   Procedure Laterality Date   • BREAST LUMPECTOMY Left     RADIATION   • CATARACT EXTRACTION Bilateral    • KNEE SURGERY Right     menscal   • LAPAROTOMY OOPHERECTOMY Left    • REFRACTIVE SURGERY Bilateral     SCAR TISSUE S/P CATARACTS SURGERY   • SENTINEL LYMPH NODE BIOPSY Left          General Information     Row Name 20 3238          PT Evaluation Time/Intention    Document Type  evaluation Pt. is s/p Right TKR  -MS     Mode of Treatment  physical therapy;individual therapy  -MS     Row Name 20 0609          General Information    Patient Profile Reviewed?  yes  -MS     Prior Level of Function  independent:  -MS     Existing Precautions/Restrictions  (S) fall Exit alarm   -MS     Barriers to Rehab  none identified  -MS     Row Name 20 4216          Cognitive Assessment/Intervention- PT/OT    Orientation Status (Cognition)  oriented x 3  -MS     Row Name 20 4158          Safety Issues, Functional Mobility    Comment, Safety Issues/Impairments (Mobility)  Gait belt used for safety.   -MS       User Key  (r) = Recorded By, (t) = Taken By, (c) = Cosigned By    Initials  Name Provider Type    Hank Cazares MORENO, PT Physical Therapist        Mobility     Row Name 07/06/20 1745          Bed Mobility Assessment/Treatment    Bed Mobility Assessment/Treatment  supine-sit  -MS     Supine-Sit [place] (Bed Mobility)  contact guard  -MS     Row Name 07/06/20 1745          Sit-Stand Transfer    Sit-Stand [place] (Transfers)  minimum assist (75% patient effort);2 person assist  -MS     Assistive Device (Sit-Stand Transfers)  walker, front-wheeled  -MS     Row Name 07/06/20 1745          Gait/Stairs Assessment/Training    [place] Level (Gait)  minimum assist (75% patient effort);2 person assist  -MS     Assistive Device (Gait)  walker, front-wheeled  -MS     Distance in Feet (Gait)  5 feet  -MS     Pattern (Gait)  step-to  -MS     Deviations/Abnormal Patterns (Gait)  antalgic;july decreased  -MS     Bilateral Gait Deviations  forward flexed posture  -MS     Comment (Gait/Stairs)  Once pt. began to ambulate, she began to void throughout ambulation.  Pt.'s bilateral knees also still very shaky (spinal), limiting her overall gait distance. Verbal/tactile cues for posture correction throughout.   -MS     Row Name 07/06/20 1745          Mobility Assessment/Intervention    Extremity Weight-bearing Status  (S) right lower extremity  -MS     Right Lower Extremity (Weight-bearing Status)  (S) weight-bearing as tolerated (WBAT)  -MS       User Key  (r) = Recorded By, (t) = Taken By, (c) = Cosigned By    Initials Name Provider Type    Hank Cazares MORENO, PT Physical Therapist        Obj/Interventions     Row Name 07/06/20 1747          General ROM    GENERAL ROM COMMENTS  BUE/LLE (WFL's)  -MS     Row Name 07/06/20 1747          MMT (Manual Muscle Testing)    General MMT Comments  BUE/LLE (3+/5)   -MS     Row Name 07/06/20 1747          Therapeutic Exercise    Comment (Therapeutic Exercise)  Right TKR ther. ex. program x 10 reps completed  -MS       User Key  (r) = Recorded By, (t) =  Taken By, (c) = Cosigned By    Initials Name Provider Type    Hank Cazares L, PT Physical Therapist        Goals/Plan     Row Name 07/06/20 1749          Bed Mobility Goal 1 (PT)    Activity/Assistive Device (Bed Mobility Goal 1, PT)  bed mobility activities, all  -MS     [place] Level/Cues Needed (Bed Mobility Goal 1, PT)  independent  -MS     Time Frame (Bed Mobility Goal 1, PT)  long term goal (LTG);3 days  -MS     Row Name 07/06/20 1749          Transfer Goal 1 (PT)    Activity/Assistive Device (Transfer Goal 1, PT)  transfers, all;walker, rolling  -MS     [place] Level/Cues Needed (Transfer Goal 1, PT)  independent  -MS     Time Frame (Transfer Goal 1, PT)  long term goal (LTG);2 - 3 days  -MS     Row Name 07/06/20 1749          Gait Training Goal 1 (PT)    Activity/Assistive Device (Gait Training Goal 1, PT)  gait (walking locomotion);walker, rolling  -MS     [place] Level (Gait Training Goal 1, PT)  independent  -MS     Distance (Gait Goal 1, PT)  100 feet  -MS     Time Frame (Gait Training Goal 1, PT)  long term goal (LTG);2 - 3 days  -MS     Row Name 07/06/20 1749          ROM Goal 1 (PT)    ROM Goal 1 (PT)  Right knee AROM (5, 85)   -MS     Time Frame (ROM Goal 1, PT)  long term goal (LTG);2 - 3 days  -MS     Row Name 07/06/20 1749          Stairs Goal 1 (PT)    Activity/Assistive Device (Stairs Goal 1, PT)  stairs, all skills  -MS     [place] Level/Cues Needed (Stairs Goal 1, PT)  contact guard assist  -MS     Number of Stairs (Stairs Goal 1, PT)  4, no Handrail  -MS     Time Frame (Stairs Goal 1, PT)  long term goal (LTG);3 days  -MS       User Key  (r) = Recorded By, (t) = Taken By, (c) = Cosigned By    Initials Name Provider Type    Hank Cazares L, PT Physical Therapist        Clinical Impression     Row Name 07/06/20 1747          Pain Assessment    Additional Documentation  Pain Scale: Numbers Pre/Post-Treatment (Group)  -MS     [facility] Name 07/06/20 1747          Pain  Scale: Numbers Pre/Post-Treatment    Pain Scale: Numbers, Pretreatment  2/10  -MS     Pain Scale: Numbers, Post-Treatment  2/10  -MS     Pain Location - Side  Right  -MS     Pain Location  knee  -MS     Row Name 07/06/20 1747          Plan of Care Review    Plan of Care Reviewed With  patient;family  -MS     Row Name 07/06/20 1747          Physical Therapy Clinical Impression    Criteria for Skilled Interventions Met (PT Clinical Impression)  treatment indicated  -MS     Rehab Potential (PT Clinical Summary)  good, to achieve stated therapy goals  -MS     Row Name 07/06/20 1747          Positioning and Restraints    Pre-Treatment Position  in bed  -MS     Post Treatment Position  chair  -MS     In Chair  notified nsg;reclined;sitting;call light within reach;encouraged to call for assist;exit alarm on;with family/caregiver All lines intact. Ice pack to Right knee.  Nurse aware of pt. voiding on herself while upright and will assist in clean up.   -MS       User Key  (r) = Recorded By, (t) = Taken By, (c) = Cosigned By    Initials Name Provider Type    Hank Cazares L, PT Physical Therapist        Outcome Measures     Row Name 07/06/20 1749          How much help from another person do you currently need...    Turning from your back to your side while in flat bed without using bedrails?  3  -MS     Moving from lying on back to sitting on the side of a flat bed without bedrails?  3  -MS     Moving to and from a bed to a chair (including a wheelchair)?  2  -MS     Standing up from a chair using your arms (e.g., wheelchair, bedside chair)?  2  -MS     Climbing 3-5 steps with a railing?  2  -MS     To walk in hospital room?  2  -MS     AM-PAC 6 Clicks Score (PT)  14  -MS     Row Name 07/06/20 1749          Functional Assessment    Outcome Measure Options  AM-PAC 6 Clicks Basic Mobility (PT)  -MS       User Key  (r) = Recorded By, (t) = Taken By, (c) = Cosigned By    Initials Name Provider Type    Hank Cazares  L, PT Physical Therapist        Physical Therapy Education                 Title: PT OT SLP Therapies (Done)     Topic: Physical Therapy (Done)     Point: Mobility training (Done)     Description:   Instruct learner(s) on safety and technique for assisting patient out of bed, chair or wheelchair.  Instruct in the proper use of assistive devices, such as walker, crutches, cane or brace.              Patient Friendly Description:   It's important to get you on your feet again, but we need to do so in a way that is safe for you. Falling has serious consequences, and your personal safety is the most important thing of all.        When it's time to get out of bed, one of us or a family member will sit next to you on the bed to give you support.     If your doctor or nurse tells you to use a walker, crutches, a cane, or a brace, be sure you use it every time you get out of bed, even if you think you don't need it.    Learning Progress Summary           Patient Acceptance, E,D, VU,NR by MS at 7/6/2020 1749                   Point: Home exercise program (Done)     Description:   Instruct learner(s) on appropriate technique for monitoring, assisting and/or progressing patient with therapeutic exercises and activities.              Learning Progress Summary           Patient Acceptance, E,D, VU,NR by MS at 7/6/2020 1749                   Point: Body mechanics (Done)     Description:   Instruct learner(s) on proper positioning and spine alignment for patient and/or caregiver during mobility tasks and/or exercises.              Learning Progress Summary           Patient Acceptance, E,D, VU,NR by MS at 7/6/2020 1749                   Point: Precautions (Done)     Description:   Instruct learner(s) on prescribed precautions during mobility and gait tasks              Learning Progress Summary           Patient Acceptance, E,D, VU,NR by MS at 7/6/2020 1749                               User Key     Initials Effective Dates Name  Provider Type Discipline    MS 04/03/18 -  Hank Clay PT Physical Therapist PT              PT Recommendation and Plan  Planned Therapy Interventions (PT Eval): balance training, bed mobility training, gait training, home exercise program, patient/family education, postural re-education, transfer training, ROM (range of motion), stair training, strengthening  Outcome Summary/Treatment Plan (PT)  Anticipated Equipment Needs at Discharge (PT): (Pt. reports she already owns a Rwx for home use. )  Anticipated Discharge Disposition (PT): home with assist, home with home health  Plan of Care Reviewed With: patient  Outcome Summary: Pt. presents with typical post op impairments related to TKR surgery that include decreased ROM, decreased strength, and decreased balance.  Pt. will benefit from skilled inpt. P.T. to address her functional deficits and to assist pt. in regaining her maximum level of independence with functional mobility.     Time Calculation:   PT Charges     Row Name 07/06/20 1751             Time Calculation    Start Time  1635  -MS      Stop Time  1655  -MS      Time Calculation (min)  20 min  -MS      PT Received On  07/06/20  -MS      PT - Next Appointment  07/07/20  -MS      PT Goal Re-Cert Due Date  07/08/20  -MS         Time Calculation- PT    Total Timed Code Minutes- PT  18 minute(s)  -MS        User Key  (r) = Recorded By, (t) = Taken By, (c) = Cosigned By    Initials Name Provider Type    MS Clay, Hank MAYER PT Physical Therapist        Therapy Charges for Today     Code Description Service Date Service Provider Modifiers Qty    02900430213 HC PT EVAL LOW COMPLEXITY 1 7/6/2020 Hank Clay, PT GP 1    99995163467 HC PT THER PROC EA 15 MIN 7/6/2020 Hank Clay, PT GP 1    53454707436 HC PT THER SUPP EA 15 MIN 7/6/2020 Hank Clay, PT GP 1          PT G-Codes  Outcome Measure Options: AM-PAC 6 Clicks Basic Mobility (PT)  AM-PAC 6 Clicks Score (PT): 14    Hank Clay  PT  7/6/2020

## 2020-07-06 NOTE — DISCHARGE PLACEMENT REQUEST
"Terri Abarca (80 y.o. 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        Admission Date Admission Type Admitting Provider Attending Provider Department, Room/Bed    7/6/20 Elective Arian Bradley MD Brown, Reid B, MD [address], P799/1    Discharge Date Discharge Disposition Discharge Destination                       Attending Provider:  Arian Bardley MD    Allergies:  Chocolate, Citrus, Codeine, Other, Pneumococcal Polysaccharide Vaccine, Pneumococcal Vaccines    Isolation:  None   Infection:  None   Code Status:  CPR    Ht:  162.6 cm (64\")   Wt:  74.6 kg (164 lb 7 oz)    Admission Cmt:  None   Principal Problem:  Primary osteoarthritis of right knee [M17.11]                 Active Insurance as of 7/6/2020     Primary Coverage     Payor Plan Insurance Group Employer/Plan Group    [facility] MEDICARE REPLACEMENT [facility] MEDICARE REPLACEMENT [id number]     Payor Plan Address Payor Plan Phone Number Payor Plan Fax Number Effective Dates    [address] [address]   1/1/2017 - None Entered    [facility] [id number]       Subscriber Name Subscriber Birth Date Member ID       TERRI ABARCA 1940 [id number]                 Emergency Contacts      (Rel.) Home Phone Work Phone Mobile Phone    Elieser Abarca (Spouse) [phone] -- --              "

## 2020-07-06 NOTE — ANESTHESIA PREPROCEDURE EVALUATION
Anesthesia Evaluation                  Airway   Mallampati: II  Dental      Pulmonary    (+) a smoker Former,   (-) asthma, sleep apnea    ROS comment: Negative patient screen for SAM    Cardiovascular     (+) hypertension,   (-) angina, LARA      Neuro/Psych  GI/Hepatic/Renal/Endo      Musculoskeletal     Abdominal    Substance History      OB/GYN          Other                        Anesthesia Plan    ASA 2     spinal       Anesthetic plan, all risks, benefits, and alternatives have been provided, discussed and informed consent has been obtained with: patient.

## 2020-07-06 NOTE — PLAN OF CARE
Problem: Patient Care Overview  Goal: Plan of Care Review  Flowsheets (Taken 7/6/2020 0768)  Plan of Care Reviewed With: patient  Outcome Summary: Pt. presents with typical post op impairments related to TKR surgery that include decreased ROM, decreased strength, and decreased balance.  Pt. will benefit from skilled inpt. P.T. to address her functional deficits and to assist pt. in regaining her maximum level of independence with functional mobility.    Patient was wearing a face mask during this therapy encounter. Therapist used appropriate personal protective equipment including mask and gloves.  Mask used was standard procedure mask. Appropriate PPE was worn during the entire therapy session. Hand hygiene was completed before and after therapy session. Patient is not in enhanced droplet precautions.

## 2020-07-07 VITALS
DIASTOLIC BLOOD PRESSURE: 64 MMHG | HEIGHT: 64 IN | HEART RATE: 58 BPM | SYSTOLIC BLOOD PRESSURE: 99 MMHG | WEIGHT: 164.44 LBS | TEMPERATURE: 98.1 F | OXYGEN SATURATION: 96 % | BODY MASS INDEX: 28.07 KG/M2 | RESPIRATION RATE: 16 BRPM

## 2020-07-07 LAB
HCT VFR BLD AUTO: 34.6 % (ref 34–46.6)
HGB BLD-MCNC: 11.3 G/DL (ref 12–15.9)

## 2020-07-07 PROCEDURE — 97110 THERAPEUTIC EXERCISES: CPT

## 2020-07-07 PROCEDURE — 85018 HEMOGLOBIN: CPT | Performed by: NURSE PRACTITIONER

## 2020-07-07 PROCEDURE — 85014 HEMATOCRIT: CPT | Performed by: NURSE PRACTITIONER

## 2020-07-07 PROCEDURE — 25010000003 CEFAZOLIN IN DEXTROSE 2-4 GM/100ML-% SOLUTION: Performed by: NURSE PRACTITIONER

## 2020-07-07 PROCEDURE — 25010000002 KETOROLAC TROMETHAMINE PER 15 MG: Performed by: NURSE PRACTITIONER

## 2020-07-07 RX ORDER — POLYETHYLENE GLYCOL 3350 17 G/17G
17 POWDER, FOR SOLUTION ORAL 2 TIMES DAILY
Qty: 12 PACKET | Refills: 0 | Status: SHIPPED | OUTPATIENT
Start: 2020-07-07 | End: 2020-07-13

## 2020-07-07 RX ORDER — PANTOPRAZOLE SODIUM 40 MG/1
40 TABLET, DELAYED RELEASE ORAL EVERY MORNING
Qty: 14 TABLET | Refills: 0 | Status: SHIPPED | OUTPATIENT
Start: 2020-07-08 | End: 2020-07-22

## 2020-07-07 RX ORDER — ONDANSETRON 4 MG/1
4 TABLET, FILM COATED ORAL EVERY 6 HOURS PRN
Qty: 10 TABLET | Refills: 0 | Status: SHIPPED | OUTPATIENT
Start: 2020-07-07 | End: 2020-07-21

## 2020-07-07 RX ORDER — ASPIRIN 81 MG/1
81 TABLET ORAL EVERY 12 HOURS SCHEDULED
Qty: 59 TABLET | Refills: 0 | Status: SHIPPED | OUTPATIENT
Start: 2020-07-07 | End: 2020-08-06

## 2020-07-07 RX ORDER — HYDROCODONE BITARTRATE AND ACETAMINOPHEN 7.5; 325 MG/1; MG/1
TABLET ORAL
Qty: 42 TABLET | Refills: 0 | Status: SHIPPED | OUTPATIENT
Start: 2020-07-07 | End: 2020-07-21

## 2020-07-07 RX ADMIN — MELOXICAM 7.5 MG: 7.5 TABLET ORAL at 07:52

## 2020-07-07 RX ADMIN — HYDROCODONE BITARTRATE AND ACETAMINOPHEN 1 TABLET: 7.5; 325 TABLET ORAL at 03:35

## 2020-07-07 RX ADMIN — HYDROCODONE BITARTRATE AND ACETAMINOPHEN 1 TABLET: 7.5; 325 TABLET ORAL at 12:58

## 2020-07-07 RX ADMIN — HYDROCODONE BITARTRATE AND ACETAMINOPHEN 1 TABLET: 7.5; 325 TABLET ORAL at 07:52

## 2020-07-07 RX ADMIN — MUPIROCIN 1 APPLICATION: 20 OINTMENT TOPICAL at 07:53

## 2020-07-07 RX ADMIN — ASPIRIN 81 MG: 81 TABLET, COATED ORAL at 07:51

## 2020-07-07 RX ADMIN — PANTOPRAZOLE SODIUM 40 MG: 40 TABLET, DELAYED RELEASE ORAL at 06:37

## 2020-07-07 RX ADMIN — KETOROLAC TROMETHAMINE 15 MG: 15 INJECTION, SOLUTION INTRAMUSCULAR; INTRAVENOUS at 00:53

## 2020-07-07 RX ADMIN — CEFAZOLIN SODIUM 2 G: 2 INJECTION, SOLUTION INTRAVENOUS at 03:35

## 2020-07-07 RX ADMIN — POLYETHYLENE GLYCOL 3350 17 G: 17 POWDER, FOR SOLUTION ORAL at 07:53

## 2020-07-07 RX ADMIN — KETOROLAC TROMETHAMINE 15 MG: 15 INJECTION, SOLUTION INTRAMUSCULAR; INTRAVENOUS at 07:55

## 2020-07-07 NOTE — DISCHARGE INSTR - ACTIVITY
Discharge Instructions: Patient is to continue with physical therapy exercises daily and continue working with the physical therapist as ordered. Patient may weight bear as tolerated. Apply ice regularly. Patient may ice for long periods of time as long as ice is not directly on the skin. Patient instructed on frequent calf pumping exercises.  Patient also instructed on incentive spirometer during hospitalization and encouraged to continue to use at home regularly.    Dressing: The dressing is designed to be left in place until you return to the office in 2 weeks.  The suction unit should stop functioning at 7 days and the green light will switch to yellow.  At that point the suction unit and tubing can be disconnected at the port closest to the dressing.  The suction unit and tubing may be discarded.  You may shower immediately upon return home, you will need to turn the pump off by depressing the orange button once and then you may disconnect the pump and tubing at the connection port.  After showering, shake off the excess water and reattach the tubing.  Restart the pump by depressing the orange button one time and you will notice the green light flashing again.  If the dressing becomes disloged or saturated it should be changed. Please refer to the ELAINE information sheet if you have any questions about the dressing.  If you have a home health nurse or therapist they can be contacted to assist with dressing change or repair. You may also call the ELAINE dressing hotline for questions related to the dressing ([phone]). If there still other problems or questions related to the dressing despite these measures then you can contact Yuli at our office [phone].  If for some reason the ELAINE dressing is removed, after 7 days the wound can be gently cleaned with antibacterial soap then allowed to dry and covered with a dry sterile dressing. The wound should be covered at all times except while showering.  Patient  may change dressings daily and prn using sterile 4x4 and paper tape, and should call if any unusual drainage, redness or swelling.*  Follow up appointment in 2 weeks - patient to call the office at [phone] to schedule.  Patient will be discharged on aspirin 81mg BID x weeks, then daily x 4weeks

## 2020-07-07 NOTE — PROGRESS NOTES
Discharge Planning Assessment  [facility]     Patient Name: Terri Garrett  MRN: [id number]  Today's Date: 7/7/2020    Admit Date: 7/6/2020    Discharge Needs Assessment     Row Name 07/07/20 1511       Living Environment    Lives With  spouse    Current Living Arrangements  home/apartment/condo    Family Caregiver if Needed  spouse    Quality of Family Relationships  helpful;involved       Resource/Environmental Concerns    Resource/Environmental Concerns  none    Home Accessibility Concerns  stairs to enter home       Transition Planning    Patient/Family Anticipates Transition to  home with family    Patient/Family Anticipated Services at Transition  home health care       Discharge Needs Assessment    Readmission Within the Last 30 Days  no previous admission in last 30 days    Concerns to be Addressed  adjustment to diagnosis/illness    Discharge Facility/Level of Care Needs  home with home health        Discharge Plan     Row Name 07/07/20 1511       Plan    Provided Post Acute Provider List?  N/A    N/A Provider List Comment  [facility] per pt's request     Final Discharge Disposition Code  06 - home with home health care    Final Note  Facesheet and dc plan verified prior to dc. Pt d/c'd home w/ spouse, referral sent to [facility] per pt request. [facility] accepted.         Destination      Coordination has not been started for this encounter.      Durable Medical Equipment      Coordination has not been started for this encounter.      Dialysis/Infusion      Coordination has not been started for this encounter.      Home Medical Care - Selection Complete      Service Provider Request Status Selected Services Address Phone Number Fax Number    [facility] [place] [facility] [address] [facility] [address] [address] [phone]      Therapy      Coordination has not been started for this encounter.      Community Resources      Coordination has not been started  for this encounter.        Expected Discharge Date and Time     Expected Discharge Date Expected Discharge Time    Jul 7, 2020         Demographic Summary    No documentation.       Functional Status    No documentation.       Psychosocial    No documentation.       Abuse/Neglect    No documentation.       Legal    No documentation.       Substance Abuse    No documentation.       Patient Forms    No documentation.           Jennifer Ny RN

## 2020-07-07 NOTE — PLAN OF CARE
Problem: Patient Care Overview  Goal: Plan of Care Review  Flowsheets (Taken 7/7/2020 1206)  Plan of Care Reviewed With: patient  Outcome Summary: Pt. has met all inpt. P.T. goals at this time and has no further questions/concerns regarding functional mobility or home safety. Plans to d/c home with HHPT this date.  Will sign off.   Patient was wearing a face mask during this therapy encounter. Therapist used appropriate personal protective equipment including mask and gloves.  Mask used was standard procedure mask. Appropriate PPE was worn during the entire therapy session. Hand hygiene was completed before and after therapy session. Patient is not in enhanced droplet precautions.

## 2020-07-07 NOTE — THERAPY DISCHARGE NOTE
Patient Name: Terri Garrett  : 1940    MRN: [id number]                              Today's Date: 2020       Admit Date: 2020    Visit Dx:     ICD-10-CM ICD-9-CM   1. Primary osteoarthritis of right knee M17.11 715.16     Patient Active Problem List   Diagnosis   • Primary osteoarthritis of right knee   • Intermittent knee pain   • Health care maintenance   • Hypertension   • Chronic pain of right knee   • Acute bilateral knee pain     Past Medical History:   Diagnosis Date   • Allergic reaction     FOOD FLAVORINGS AND ADDITIVES   • Arthritis    • Breast cancer (CMS/HCC)    • Hard of hearing     BILATERAL AIDS   • History of foot fracture     LEFT   • Hypertension    • Insomnia    • Knee pain     RIGHT   • Spinal headache     S/P SPINAL BLOCK W/CHILDBIRTH   • Vitiligo      Past Surgical History:   Procedure Laterality Date   • BREAST LUMPECTOMY Left     RADIATION   • CATARACT EXTRACTION Bilateral    • KNEE SURGERY Right     menscal   • LAPAROTOMY OOPHERECTOMY Left    • REFRACTIVE SURGERY Bilateral     SCAR TISSUE S/P CATARACTS SURGERY   • SENTINEL LYMPH NODE BIOPSY Left        • TOTAL KNEE ARTHROPLASTY Right 2020    Procedure: TOTAL KNEE ARTHROPLASTY;  Surgeon: Arian Bradley MD;  Location: [facility];  Service: Orthopedics;  Laterality: Right;     General Information     Row Name 20 1204          PT Evaluation Time/Intention    Document Type  therapy note (daily note);discharge treatment  -MS     Mode of Treatment  physical therapy;individual therapy  -MS     Row Name 20 1204          General Information    Patient Profile Reviewed?  yes  -MS     Existing Precautions/Restrictions  (S) -- Exit alarm   -MS     Barriers to Rehab  none identified  -MS     Row Name 20 1204          Cognitive Assessment/Intervention- PT/OT    Orientation Status (Cognition)  oriented x 3  -MS     Row Name 20 1204          Safety Issues, Functional Mobility    Comment, Safety  Issues/Impairments (Mobility)  Gait belt used for safety.   -MS       User Key  (r) = Recorded By, (t) = Taken By, (c) = Cosigned By    Initials Name Provider Type    Hank Cazares PT Physical Therapist        Mobility     Row Name 07/07/20 1204          Bed Mobility Assessment/Treatment    Bed Mobility Assessment/Treatment  supine-sit;sit-supine  -MS     Supine-Sit [place] (Bed Mobility)  independent  -MS     Row Name 07/07/20 1204          Sit-Stand Transfer    Sit-Stand [place] (Transfers)  independent  -MS     Assistive Device (Sit-Stand Transfers)  walker, front-wheeled  -MS     Row Name 07/07/20 1204          Gait/Stairs Assessment/Training    [place] Level (Gait)  independent  -MS     Assistive Device (Gait)  walker, front-wheeled  -MS     Distance in Feet (Gait)  230 feet  -MS     Pattern (Gait)  step-through  -MS     Deviations/Abnormal Patterns (Gait)  antalgic  -MS     [place] Level (Stairs)  contact guard  -MS     Number of Steps (Stairs)  4, backwards with use of walker  -MS     Ascending Technique (Stairs)  step-to-step  -MS     Descending Technique (Stairs)  step-to-step  -MS       User Key  (r) = Recorded By, (t) = Taken By, (c) = Cosigned By    Initials Name Provider Type    Hank Cazares PT Physical Therapist        Obj/Interventions     Row Name 07/07/20 1206          General ROM    GENERAL ROM COMMENTS  Right knee AROM (2, 110)  -MS     Row Name 07/07/20 1206          Therapeutic Exercise    Comment (Therapeutic Exercise)  Right TKR ther. ex. program x 15 reps completed  -MS       User Key  (r) = Recorded By, (t) = Taken By, (c) = Cosigned By    Initials Name Provider Type    Hank Cazares, PT Physical Therapist        Goals/Plan    No documentation.       Clinical Impression     Row Name 07/07/20 1206          Pain Assessment    Additional Documentation  Pain Scale: Numbers Pre/Post-Treatment (Group)  -MS     Row Name 07/07/20 1206          Pain Scale:  Numbers Pre/Post-Treatment    Pain Scale: Numbers, Pretreatment  2/10  -MS     Pain Scale: Numbers, Post-Treatment  2/10  -MS     Pain Location - Side  Right  -MS     Pain Location  knee  -MS     Row Name 07/07/20 1206          Positioning and Restraints    Pre-Treatment Position  in bed  -MS     Post Treatment Position  chair  -MS     In Chair  notified nsg;reclined;sitting;call light within reach;encouraged to call for assist;exit alarm on Ice pack to Right knee.   -MS       User Key  (r) = Recorded By, (t) = Taken By, (c) = Cosigned By    Initials Name Provider Type    Hank Cazares, PT Physical Therapist        Outcome Measures     Row Name 07/07/20 1207          How much help from another person do you currently need...    Turning from your back to your side while in flat bed without using bedrails?  4  -MS     Moving from lying on back to sitting on the side of a flat bed without bedrails?  4  -MS     Moving to and from a bed to a chair (including a wheelchair)?  4  -MS     Standing up from a chair using your arms (e.g., wheelchair, bedside chair)?  4  -MS     Climbing 3-5 steps with a railing?  3  -MS     To walk in hospital room?  4  -MS     AM-PAC 6 Clicks Score (PT)  23  -MS       User Key  (r) = Recorded By, (t) = Taken By, (c) = Cosigned By    Initials Name Provider Type    Hank Cazares, PT Physical Therapist        Physical Therapy Education                 Title: PT OT SLP Therapies (Resolved)     Topic: Physical Therapy (Resolved)     Point: Mobility training (Resolved)     Description:   Instruct learner(s) on safety and technique for assisting patient out of bed, chair or wheelchair.  Instruct in the proper use of assistive devices, such as walker, crutches, cane or brace.              Patient Friendly Description:   It's important to get you on your feet again, but we need to do so in a way that is safe for you. Falling has serious consequences, and your personal safety is the most  important thing of all.        When it's time to get out of bed, one of us or a family member will sit next to you on the bed to give you support.     If your doctor or nurse tells you to use a walker, crutches, a cane, or a brace, be sure you use it every time you get out of bed, even if you think you don't need it.    Learning Progress Summary           Patient Acceptance, E,D, VU,DU by MS at 7/7/2020 1207    Acceptance, E,D, VU,NR by MS at 7/6/2020 1749                   Point: Home exercise program (Resolved)     Description:   Instruct learner(s) on appropriate technique for monitoring, assisting and/or progressing patient with therapeutic exercises and activities.              Learning Progress Summary           Patient Acceptance, E,D, VU,DU by MS at 7/7/2020 1207    Acceptance, E,D, VU,NR by MS at 7/6/2020 1749                   Point: Body mechanics (Resolved)     Description:   Instruct learner(s) on proper positioning and spine alignment for patient and/or caregiver during mobility tasks and/or exercises.              Learning Progress Summary           Patient Acceptance, E,D, VU,DU by MS at 7/7/2020 1207    Acceptance, E,D, VU,NR by MS at 7/6/2020 1749                   Point: Precautions (Resolved)     Description:   Instruct learner(s) on prescribed precautions during mobility and gait tasks              Learning Progress Summary           Patient Acceptance, E,D, VU,DU by MS at 7/7/2020 1207    Acceptance, E,D, VU,NR by MS at 7/6/2020 1749                               User Key     Initials Effective Dates Name Provider Type Discipline    MS 04/03/18 -  Hank Clay, PT Physical Therapist PT              PT Recommendation and Plan  Planned Therapy Interventions (PT Eval): balance training, bed mobility training, gait training, home exercise program, patient/family education, postural re-education, transfer training, ROM (range of motion), stair training, strengthening  Outcome Summary/Treatment  Plan (PT)  Anticipated Equipment Needs at Discharge (PT): (Pt. reports she already owns a Rwx for home use. )  Anticipated Discharge Disposition (PT): home with assist, home with home health  Plan of Care Reviewed With: patient  Outcome Summary: Pt. has met all inpt. P.T. goals at this time and has no further questions/concerns regarding functional mobility or home safety. Plans to d/c home with HHPT this date.  Will sign off.     Time Calculation:   PT Charges     Row Name 07/07/20 1208             Time Calculation    Start Time  1028  -MS      Stop Time  1044  -MS      Time Calculation (min)  16 min  -MS      PT Received On  07/07/20  -MS         Time Calculation- PT    Total Timed Code Minutes- PT  14 minute(s)  -MS        User Key  (r) = Recorded By, (t) = Taken By, (c) = Cosigned By    Initials Name Provider Type    Hank Cazares, PT Physical Therapist        Therapy Charges for Today     Code Description Service Date Service Provider Modifiers Qty    24414583641 HC PT EVAL LOW COMPLEXITY 1 7/6/2020 Hank Clay, PT GP 1    46059358922 HC PT THER PROC EA 15 MIN 7/6/2020 Hank Clay, PT GP 1    82468655692 HC PT THER SUPP EA 15 MIN 7/6/2020 Hank Clay, PT GP 1    94409756650 HC PT THER PROC EA 15 MIN 7/7/2020 Hank Clay, PT GP 1          PT G-Codes  Outcome Measure Options: AM-PAC 6 Clicks Basic Mobility (PT)  AM-PAC 6 Clicks Score (PT): 23    PT Discharge Summary  Anticipated Discharge Disposition (PT): home with assist, home with home health  Reason for Discharge: All goals achieved  Outcomes Achieved: Refer to plan of care for updates on goals achieved  Discharge Destination: Home with assist, Home with home health    Hank Clay, PT  7/7/2020

## 2020-07-07 NOTE — PLAN OF CARE
Problem: Patient Care Overview  Goal: Plan of Care Review  Outcome: Ongoing (interventions implemented as appropriate)  Flowsheets  Taken 7/7/2020 0100 by Queen ZEESHAN Sequeira, RN  Progress: improving  Plan of Care Reviewed With: patient  Taken 7/7/2020 0852 by Hallie Contreras, RN  Outcome Summary: POD 1. VSS. NVI. Dressing clean dry and intact. Voiding function intact. Pain well controlled. Ambulating with assist x1 and walker. Plans to d/c today. Will continue to monitor.

## 2020-07-07 NOTE — DISCHARGE SUMMARY
Patient Name: Terri Garrett  Patient YOB: 1940    Date of Admission:  7/6/2020  Date of Discharge:  7/7/2020  Discharge Diagnosis: TOTAL KNEE ARTHROPLASTY  Presenting Problem/History of Present Illness: Primary osteoarthritis of right knee [M17.11]  Acute bilateral knee pain [M25.561, M25.562]  Primary osteoarthritis of right knee [M17.11]  Admitting Physician: Dr Arian Bradley  Consults:   Consults     No orders found for last 30 day(s).          DETAILS OF HOSPITAL STAY:  Patient is a 80 y.o. female was admitted to the floor following the above procedure and underwent an uncomplicated hospital stay.  Patient did well with physical therapy and was ambulating well without problems.  On the day of discharge the wound was clean, dry and intact and calf was soft and non tender and Homans sign was negative.  Patient was tolerating  without problems.  Patient will be discharged home.    Condition on Discharge:  Stable    Vital Signs  Temp:  [96.9 °F (36.1 °C)-98.1 °F (36.7 °C)] 98.1 °F (36.7 °C)  Heart Rate:  [58-87] 58  Resp:  [16-18] 16  BP: ()/(57-71) 99/64    LABS:      Admission on 07/06/2020   Component Date Value Ref Range Status   • Hemoglobin 07/07/2020 11.3* 12.0 - 15.9 g/dL Final   • Hematocrit 07/07/2020 34.6  34.0 - 46.6 % Final       No results found.    Discharge Medications     Discharge Medications      New Medications      Instructions Start Date   aspirin 81 MG EC tablet   81 mg, Oral, Every 12 Hours Scheduled      HYDROcodone-acetaminophen 7.5-325 MG per tablet  Commonly known as:  NORCO   1-2 po q 4-6 hr prn pain      ondansetron 4 MG tablet  Commonly known as:  ZOFRAN   4 mg, Oral, Every 6 Hours PRN      pantoprazole 40 MG EC tablet  Commonly known as:  PROTONIX   40 mg, Oral, Every Morning   Start Date:  July 8, 2020     polyethylene glycol 17 g packet  Commonly known as:  MIRALAX   17 g, Oral, 2 Times Daily         Changes to Medications      Instructions Start Date    meloxicam 15 MG tablet  Commonly known as:  MOBIC  What changed:  additional instructions   1 PO Daily with food.         Continue These Medications      Instructions Start Date   acetaminophen 650 MG 8 hr tablet  Commonly known as:  TYLENOL   650 mg, Oral, Daily      Chlor-Trimeton Allergy 12 MG tablet controlled-release  Generic drug:  Chlorpheniramine Maleate ER   Oral, Daily      losartan 100 MG tablet  Commonly known as:  COZAAR   TAKE 1 TABLET DAILY (NEED APPOINTMENT WITH NEW DOCTOR BEFORE THIS PRESCRIPTION RUNS OUT )         Stop These Medications    calcium carbonate (oyster shell) 500 MG tablet tablet     Chlorhexidine Gluconate Cloth 2 % pads     GLUCOSAMINE CHONDR 1500 COMPLX PO     MULTI FOR HER 50+ PO     mupirocin 2 % nasal ointment  Commonly known as:  BACTROBAN            Activity at Discharge:     Discharge Instructions: Patient is to continue with physical therapy exercises daily and continue working with the physical therapist as ordered. Patient may weight bear as tolerated. Apply ice regularly. Patient may ice for long periods of time as long as ice is not directly on the skin. Patient instructed on frequent calf pumping exercises.  Patient also instructed on incentive spirometer during hospitalization and encouraged to continue to use at home regularly.    Dressing: The dressing is designed to be left in place until you return to the office in 2 weeks.  The suction unit should stop functioning at 7 days and the green light will switch to yellow.  At that point the suction unit and tubing can be disconnected at the port closest to the dressing.  The suction unit and tubing may be discarded.  You may shower immediately upon return home, you will need to turn the pump off by depressing the orange button once and then you may disconnect the pump and tubing at the connection port.  After showering, shake off the excess water and reattach the tubing.  Restart the pump by depressing the orange  button one time and you will notice the green light flashing again.  If the dressing becomes disloged or saturated it should be changed. Please refer to the ELAINE information sheet if you have any questions about the dressing.  If you have a home health nurse or therapist they can be contacted to assist with dressing change or repair. You may also call the ELAINE dressing hotline for questions related to the dressing ([phone]). If there still other problems or questions related to the dressing despite these measures then you can contact Yuli at our office [phone].  If for some reason the ELAINE dressing is removed, after 7 days the wound can be gently cleaned with antibacterial soap then allowed to dry and covered with a dry sterile dressing. The wound should be covered at all times except while showering.  Patient may change dressings daily and prn using sterile 4x4 and paper tape, and should call if any unusual drainage, redness or swelling.*  Follow up appointment in 2 weeks - patient to call the office at [phone] to schedule.  Patient will be discharged on aspirin 81mg BID x weeks, then daily x 4weeks    Discharge Diagnosis:    Patient Active Problem List   Diagnosis   • Primary osteoarthritis of right knee   • Intermittent knee pain   • Health care maintenance   • Hypertension   • Chronic pain of right knee   • Acute bilateral knee pain       Follow-up Appointments  No future appointments.         Arian Bradley MD  07/07/20  08:04

## 2020-07-14 ENCOUNTER — TELEPHONE (OUTPATIENT)
Dept: ORTHOPEDIC SURGERY | Facility: CLINIC | Age: 80
End: 2020-07-14

## 2020-07-14 NOTE — TELEPHONE ENCOUNTER
Patient called and informed that she needs to go to ER for complaints of chest pain.  Patient states that her pain is intermittent and having no pain at present.  Patient verbalizes understanding about going to ER.

## 2020-07-14 NOTE — TELEPHONE ENCOUNTER
Call returned to the patient.  Her cristobal battery has ceased working.  Have advised the patient that she is now week postop and she go ahead and discontinue the battery.  She has been instructed to leave the dressing on as long as it remains dry and intact until her follow-up visit with Dr. Bradley.  Patient did let me know that she is not sleeping well at night because of pain.  She is having some knee pain but seems to be progressing with home PT.  She did stop taking the hydrocodone due to increased symptoms of nausea.  She is continued with the meloxicam and Tylenol in the morning and then takes Tylenol at night with only fair relief.  She also is complaining of some left-sided chest pain underneath her left breast.  The area has no ecchymosis and no swelling but is tender to touch.  She describes it as sharp, stabbing pain.  Pain is somewhat better with using heat.  She denies any shortness of breath and no increased pain on inspiration.  Pain is worse at nighttime.  Patient states that this developed about 4- 5 days after her surgery.  It does not seem to be getting any worse but also not getting any better.  Will discuss with RBB

## 2020-07-14 NOTE — TELEPHONE ENCOUNTER
Patient has questions regarding the suction unit, patient also says that she isn't sleeping and would like for someone to return her call.

## 2020-07-21 ENCOUNTER — OFFICE VISIT (OUTPATIENT)
Dept: ORTHOPEDIC SURGERY | Facility: CLINIC | Age: 80
End: 2020-07-21

## 2020-07-21 VITALS — TEMPERATURE: 97.1 F | BODY MASS INDEX: 28.19 KG/M2 | HEIGHT: 64 IN | WEIGHT: 165.1 LBS

## 2020-07-21 DIAGNOSIS — Z96.651 STATUS POST TOTAL RIGHT KNEE REPLACEMENT: Primary | ICD-10-CM

## 2020-07-21 PROCEDURE — 99024 POSTOP FOLLOW-UP VISIT: CPT | Performed by: NURSE PRACTITIONER

## 2020-07-21 NOTE — PROGRESS NOTES
Terri Garrett : 1940 MRN: [id number] DATE: 2020    DIAGNOSIS: 2 week follow up right total knee      SUBJECTIVE:Patient returns today for 2 week follow up of right total knee replacement. Patient reports doing well with no unusual complaints. Appears to be progressing appropriately.    OBJECTIVE:   Exam:. The incision is healing appropriately. No sign of infection. Range of motion is progressing as expected. The calf is soft and nontender with a negative Homans sign.    ASSESSMENT: 2 week status post right knee replacement.    PLAN: 1) Staples removed and steri strips applied   2) Order given for PT   3) Discontinue MOHIT hose   4) Continue ice PRN   5) aspirin 81 mg orally every day for 1 month   6) Follow up in 6 weeks with repeat Xrays of right knee (3views)    Tawny Metcalf, APRN  2020

## 2020-08-19 ENCOUNTER — TELEPHONE (OUTPATIENT)
Dept: ORTHOPEDIC SURGERY | Facility: CLINIC | Age: 80
End: 2020-08-19

## 2020-08-19 NOTE — TELEPHONE ENCOUNTER
Patient has 4 WK PO / RIGHT TKA 07/06/20 to see RBB on 9/03/20 however patient reports she's had a clicking & popping behind RIGHT surgical Knee since Sun 8/16 - Mon 8/17. Physical Therapist also felt some fluid behind RIGHT Knee & thought patient should be seen sooner than 9/03/20. Would MLL like to work patient in next week? Of to leave a voicemail for patient at [phone] (will be available after noon Thurs 8/20). Thanks /srh

## 2020-08-20 NOTE — TELEPHONE ENCOUNTER
It is not unusual to have clicking and popping in the surgical knee.  Also patient is going to have swelling off and on in that knee for the next several months.  It takes a full year for all the soft tissues to heal.  Would recommend continued physical therapy, ice, anti-inflammatories and keep appointment with Dr. Bradley as scheduled

## 2020-08-24 ENCOUNTER — TELEPHONE (OUTPATIENT)
Dept: ORTHOPEDIC SURGERY | Facility: CLINIC | Age: 80
End: 2020-08-24

## 2020-08-24 NOTE — TELEPHONE ENCOUNTER
Yes, sounds like it is most likely some swelling back behind the knee.  That is very normal over the next several months.  It takes a full year for all the soft tissues to heal so she can expect continued pain swelling feelings of fullness.  These are all very normal feelings over the next several months.  Would recommend that she continue working with physical therapy, ice, and keep follow-up appointment as scheduled

## 2020-08-24 NOTE — TELEPHONE ENCOUNTER
----- Message from Terri Garrett sent at 8/24/2020 10:32 AM EDT -----  Regarding: Non-Urgent Medical Question  Contact: [phone]  I am having trouble with the back of my right knee.  My physical therapist thinks there may be a pocket of fluid.  When I bend it there is a clicking, or rubbing or catching--I don't know how to describe it, but it affects normal walking.  There is also some pain, which I son't think is related to the knee replacement.  I am not scheduled in until Sept. 3, and  I wonder if there is anything I could/should do in the meantime?

## 2020-09-03 ENCOUNTER — OFFICE VISIT (OUTPATIENT)
Dept: ORTHOPEDIC SURGERY | Facility: CLINIC | Age: 80
End: 2020-09-03

## 2020-09-03 VITALS — WEIGHT: 165 LBS | BODY MASS INDEX: 28.17 KG/M2 | TEMPERATURE: 97.5 F | HEIGHT: 64 IN

## 2020-09-03 DIAGNOSIS — Z96.651 STATUS POST TOTAL RIGHT KNEE REPLACEMENT: Primary | ICD-10-CM

## 2020-09-03 PROCEDURE — 73562 X-RAY EXAM OF KNEE 3: CPT | Performed by: ORTHOPAEDIC SURGERY

## 2020-09-03 PROCEDURE — 99024 POSTOP FOLLOW-UP VISIT: CPT | Performed by: ORTHOPAEDIC SURGERY

## 2020-09-03 RX ORDER — LOSARTAN POTASSIUM 50 MG/1
50 TABLET ORAL DAILY
COMMUNITY
Start: 2020-07-28 | End: 2021-07-28

## 2020-09-03 RX ORDER — ASPIRIN 81 MG/1
81 TABLET ORAL DAILY
COMMUNITY

## 2020-09-03 RX ORDER — BRIMONIDINE TARTRATE 2 MG/ML
SOLUTION/ DROPS OPHTHALMIC
COMMUNITY
Start: 2020-06-25

## 2020-09-03 NOTE — PROGRESS NOTES
Terri Garrett : 1940 MRN: [id number] DATE: 9/3/2020    DIAGNOSIS: 8 week follow up right total knee      SUBJECTIVE:Patient returns today for 8 week follow up of right total knee replacement. Patient reports doing well with no unusual complaints. Appears to be progressing appropriately.    OBJECTIVE:   Exam:. The incision is well healed. No sign of infection. Range of motion is measured at 3 to 120. The calf is soft and nontender with a negative Homans sign. Strength is progressing and the patient is ambulating appropriately.    DIAGNOSTIC STUDIES  Xrays: 3 views of the right knee (AP, lateral, and sunrise) were ordered and reviewed for evaluation of recent knee replacement. They demonstrate a well positioned, well aligned knee replacement without complicating factors noted. In comparison with previous films there has been no change.    ASSESSMENT: 8 week status post right knee replacement.    PLAN: 1) Continue with PT exercises as prescribed   2) Follow up in 10 months    Arian Bradley MD  9/3/2020

## 2020-10-05 RX ORDER — CEPHALEXIN 500 MG/1
CAPSULE ORAL
Qty: 4 CAPSULE | Refills: 0 | Status: CANCELLED | OUTPATIENT
Start: 2020-10-05

## 2020-10-05 RX ORDER — CEPHALEXIN 500 MG/1
CAPSULE ORAL
Qty: 4 CAPSULE | Refills: 5 | Status: SHIPPED | OUTPATIENT
Start: 2020-10-05

## 2020-10-05 NOTE — TELEPHONE ENCOUNTER
Patient called back and said her dental appt is 10/6/20 at 2:45 since it is urgent.  Please call in Rx ASAP.  Pharmacy said they could fill it in 10 minutes.

## 2020-10-05 NOTE — TELEPHONE ENCOUNTER
Patient needs antibiotic for dental crown that is broken.  She is not allergic to any antibiotics.  She had a TKA by RBB on 7/6/2020.  Send to [facility].  ([facility] is out today)

## 2020-10-06 NOTE — TELEPHONE ENCOUNTER
Notified patient that her antibiotic was sent in.  She wanted to clarify pharmacy as she received a phone call that a prescription was coming from [facility].  I phoned correct pharmacy and ordered her antibiotic.  Spoke with patient again who voiced understanding and appreciation.

## 2020-10-12 ENCOUNTER — TELEPHONE (OUTPATIENT)
Dept: ORTHOPEDIC SURGERY | Facility: CLINIC | Age: 80
End: 2020-10-12

## 2020-10-12 NOTE — TELEPHONE ENCOUNTER
Great conversation for patient to have with pcp since she istaking more often than we usually prescribe.  Needs to discuss safety with them

## 2020-10-12 NOTE — TELEPHONE ENCOUNTER
----- Message from Terri Garrett sent at 10/12/2020 11:46 AM EDT -----  Regarding: Prescription Question  Contact: [phone]  MY CHART MESSAGE:    Before and after my July 6 knee replacement, I was taking meloxicam, as prescribed by you.  I took it for about 8 weeks after surgery, and then stopped because I had little pain.  I am also taking arthritis 8-hour Tylenol twice a day.   When I stopped the meloxicam, the pain came back, so I refilled the prescription.  I have arthritis in my hands, fingers, lower back, left knee, neck, and the Tylenol doesn't really help.   But after 30 days, I stopped the meloxicam again because I was again pain free; and I was doing well in physical therapy.  That was a week ago.   I can no longer go up and down stairs easily, or sit and get out of chairs.  I ache!  But I'm worried about long term effects of meloxicam.  Can you give me any advice?

## 2021-03-09 DIAGNOSIS — Z23 IMMUNIZATION DUE: ICD-10-CM

## 2021-03-10 ENCOUNTER — APPOINTMENT (OUTPATIENT)
Dept: WOMENS IMAGING | Facility: HOSPITAL | Age: 81
End: 2021-03-10

## 2021-03-10 PROCEDURE — 77067 SCR MAMMO BI INCL CAD: CPT | Performed by: RADIOLOGY

## 2021-03-10 PROCEDURE — 77063 BREAST TOMOSYNTHESIS BI: CPT | Performed by: RADIOLOGY

## 2021-07-13 ENCOUNTER — OFFICE VISIT (OUTPATIENT)
Dept: ORTHOPEDIC SURGERY | Facility: CLINIC | Age: 81
End: 2021-07-13

## 2021-07-13 VITALS — WEIGHT: 157 LBS | HEIGHT: 64 IN | BODY MASS INDEX: 26.8 KG/M2 | TEMPERATURE: 97.5 F

## 2021-07-13 DIAGNOSIS — Z96.651 STATUS POST TOTAL RIGHT KNEE REPLACEMENT: Primary | ICD-10-CM

## 2021-07-13 PROCEDURE — 99212 OFFICE O/P EST SF 10 MIN: CPT | Performed by: NURSE PRACTITIONER

## 2021-07-13 PROCEDURE — 73562 X-RAY EXAM OF KNEE 3: CPT | Performed by: NURSE PRACTITIONER

## 2021-07-13 NOTE — PROGRESS NOTES
"Terri Garrett : 1940 MRN: [id number] DATE: 2021    Chief Complaint:  Follow up right total knee      SUBJECTIVE:Patient returns today for 1 year follow up of right total knee replacement. Patient reports doing well with no unusual complaints. Denies any limitations due to the knee.    OBJECTIVE:    Temp 97.5 °F (36.4 °C)   Ht 162.6 cm (64\")   Wt 71.2 kg (157 lb)   BMI 26.95 kg/m²   Family History   Problem Relation Age of Onset   • Cancer Mother         breast   • Malig Hyperthermia Neg Hx      Past Medical History:   Diagnosis Date   • Allergic reaction     FOOD FLAVORINGS AND ADDITIVES   • Arthritis    • Breast cancer (CMS/HCC)    • Hard of hearing     BILATERAL AIDS   • History of foot fracture     LEFT   • Hypertension    • Insomnia    • Knee pain     RIGHT   • Spinal headache     S/P SPINAL BLOCK W/CHILDBIRTH   • Vitiligo      Past Surgical History:   Procedure Laterality Date   • BREAST LUMPECTOMY Left     RADIATION   • CATARACT EXTRACTION Bilateral    • KNEE SURGERY Right     menscal   • LAPAROTOMY OOPHERECTOMY Left    • REFRACTIVE SURGERY Bilateral     SCAR TISSUE S/P CATARACTS SURGERY   • SENTINEL LYMPH NODE BIOPSY Left        • TOTAL KNEE ARTHROPLASTY Right 2020    Procedure: TOTAL KNEE ARTHROPLASTY;  Surgeon: Arian Bradley MD;  Location: [facility];  Service: Orthopedics;  Laterality: Right;     Social History     Socioeconomic History   • Marital status:      Spouse name: Not on file   • Number of children: Not on file   • Years of education: Not on file   • Highest education level: Not on file   Tobacco Use   • Smoking status: Former Smoker     Packs/day: 1.00     Years: 15.00     Pack years: 15.00     Quit date: 1976     Years since quittin.9   • Smokeless tobacco: Never Used   Substance and Sexual Activity   • Alcohol use: Yes     Alcohol/week: 7.0 standard drinks     Types: 7 Glasses of wine per week   • Drug use: Never   • Sexual activity: " Defer       Review of Systems: 14 point review of systems performed pertinent positives and negatives discussed above, all other systems are negative    Exam:. The incision is well healed. Range of motion is measured at 0 to 122. The calf is soft and nontender with a negative Homans sign. Alignment is neutral. Good quad strength. There is no evidence of varus/valgus or flexion instability. No effusion. Intact to light touch with palpable distal pulses.     DIAGNOSTIC STUDIES  Xrays: 3 views(AP bilateral knees, lateral right, and sunrise bilateral knees) were ordered and reviewed for evaluation of right knee replacement. They demonstrate a well positioned, well aligned knee replacement without complicating factors noted. In comparison with previous films there has been no change.    ASSESSMENT:   Annual follow up right knee replacement. doing well       PLAN:    Continue activities as tolerated  Follow up PRN    Tawny Metcalf, APRN  7/13/2021

## 2022-03-14 ENCOUNTER — APPOINTMENT (OUTPATIENT)
Dept: WOMENS IMAGING | Facility: HOSPITAL | Age: 82
End: 2022-03-14

## 2022-03-14 PROCEDURE — 77063 BREAST TOMOSYNTHESIS BI: CPT | Performed by: RADIOLOGY

## 2022-03-14 PROCEDURE — 77067 SCR MAMMO BI INCL CAD: CPT | Performed by: RADIOLOGY

## 2023-09-06 ENCOUNTER — TELEPHONE (OUTPATIENT)
Dept: ORTHOPEDIC SURGERY | Facility: CLINIC | Age: 83
End: 2023-09-06

## 2023-09-06 NOTE — TELEPHONE ENCOUNTER
Tawny is out of office today, please advise if patient needs another round of antibiotics for her procedure.

## 2023-09-06 NOTE — TELEPHONE ENCOUNTER
Yes she needs a dose for every single visit. This is duplicate message that MLL has already addressed.

## 2023-09-06 NOTE — TELEPHONE ENCOUNTER
UNABLE TO WT CLINICAL     Hub staff attempted to follow warm transfer process and was unsuccessful     Caller: BALWINDER ABARCA     Relationship to patient: PATIENT     Best call back number: [phone]    Patient is needing: PATIENT SX RIGHT KNEE 07/06/20  PATIENT STATES SHE HAD DENTAL WORK (CLEANING ) 09/05/23 AND TOOK 4 TABLETS /ANTIBIOTICS.  PATIENT IS GOING BACK TODAY 09/06/23 FOR A FILLING AND WANTS TO KNOW IF SHE SHOULD TAKE ANOTHER 4 TABLETS?  PATIENT'S APPOINTMENT IS TODAY 09/06/23 AT 11:30.    PATIENT SAYS SHE HAS BEEN CALLING ON THE CLINICAL LINE OPTION SINCE YESTERDAY TO NO AVAIL.

## 2024-03-29 NOTE — TELEPHONE ENCOUNTER
-- DO NOT REPLY / DO NOT REPLY ALL --  -- Message is from [facility] --    General Patient Message: [facility] calling to request a walker with a seat and a wheelchair for patient. Caller was disconnected before any further information could be procured. Alternative phone number: na    Can a detailed message be left? No    Message Turnaround:     Is it Working Hours?  Yes - Working Hours Notified patient SUSAN approved of patient switching to RBB & scheduled patient to see RBB on 5/14 at [facility] for OPNC / BILAT Knees / NKI / NXR / to Discuss SXs.     New patient packet mailed & patient verbalized understanding.

## 2025-04-16 NOTE — PROGRESS NOTES
New Right Knee      Patient: Terri Garrett        YOB: 1940    Medical Record Number: [id number]        Chief Complaints: right knee pain  Chief Complaint   Patient presents with   • Right Knee - Establish Care           History of Present Illness: This is a 78-year-old female who presents complaining of right knee pain she is a former patient of Dr. Nuñez's was last seen in September by him for an injection.  She states she got great relief since then has been essentially pain-free until just recently as she is getting ready to go out of the country and wanted to get at least an evaluation of what she needs to do next step.  Her friends return her to get it replaced her current symptoms are moderate intermittent aching swelling worse with standing walking somewhat better with rest she is a retired teacher past medical history smart for breast cancer 14 point review of systems are remarkable for the pertinent positives listed in the chart by the patient the remainder negative      Allergies:   Allergies   Allergen Reactions   • Chocolate    • Citrus    • Codeine    • Other      Balsam of [place] - causes contact dermatitis   • Pneumococcal Polysaccharide Vaccine        Medications:   Home Medications:  Current Outpatient Medications on File Prior to Visit   Medication Sig   • aspirin 81 MG EC tablet Take  by mouth daily.   • bacitracin 500 UNIT/GM ointment Apply  topically.   • Calcium Carbonate (CALCIUM CHEW) 500 MG chewable tablet Chew daily.   • Chlorpheniramine Maleate ER (CHLOR-TRIMETON ALLERGY) 12 MG tablet controlled-release Take  by mouth.   • clobetasol (TEMOVATE) 0.05 % ointment Apply 1 inch topically 2 (two) times a day.   • Glucosamine-Chondroit-Vit C-Mn (GLUCOSAMINE CHONDR 1500 COMPLX PO) Take  by mouth daily.   • losartan (COZAAR) 100 MG tablet TAKE 1 TABLET DAILY (NEED APPOINTMENT WITH NEW DOCTOR BEFORE THIS PRESCRIPTION RUNS OUT )   • Multiple Vitamins-Minerals (MULTI FOR HER 50+ PO)  Patient is here alone today.    Patient is requesting a work and school excuse.    If any information or results need to be relayed from today's visit, the best way to contact the patient is via [phone] (mobile) - Patient gives verbal permission to leave a detailed voicemail at the number provided.       Take  by mouth daily.     No current facility-administered medications on file prior to visit.      Current Medications:  Scheduled Meds:  Continuous Infusions:  No current facility-administered medications for this visit.   PRN Meds:.    Past Medical History:   Diagnosis Date   • Breast cancer (CMS/HCC)    • Hypertension         Past Surgical History:   Procedure Laterality Date   • BREAST LUMPECTOMY     • CATARACT EXTRACTION     • KNEE SURGERY Right     menscal        Social History     Occupational History   • Not on file   Tobacco Use   • Smoking status: Former Smoker     Last attempt to quit: 1976     Years since quittin.5   • Smokeless tobacco: Never Used   Substance and Sexual Activity   • Alcohol use: Yes     Comment: 1 glass of wine with dinner   • Drug use: Defer   • Sexual activity: Defer    Social History     Social History Narrative   • Not on file        Family History   Problem Relation Age of Onset   • Cancer Mother         breast             Review of Systems: 14 point review of systems are remarkable for the pertinent positives listed in the chart by the patient the remainder negative    Review of Systems      Physical Exam: 78 y.o. female  General Appearance:    Alert, cooperative, in no acute distress                 There were no vitals filed for this visit.   Patient is alert and read ×3 no acute distress appears her above-listed at height weight and age.  Affect is normal respiratory rate is normal unlabored. Heart rate regular rate rhythm, sclera, dentition and hearing are normal for the purpose of this exam.        Ortho Exam  Physical exam of the right knee reveals no effusion, no erythema.  It mild loss of extension and full flexion  Patient has mild varus alignment.  They have mild tenderness to palpation about the medial compartment, no tenderness laterally..  The patient has a negative bounce home, negative Leia and a stable ligamentous exam.  Quad tone is reasonable  and symmetric.  There are no overlying skin changes no lymphedema no lymphadenopathy.  There is good hip range of motion which is full symmetric and asymptomatic and a normal ankle exam.    Large Joint Arthrocentesis: R knee  Date/Time: 2/21/2019 8:59 AM  Consent given by: patient  Site marked: site marked  Timeout: Immediately prior to procedure a time out was called to verify the correct patient, procedure, equipment, support staff and site/side marked as required   Supporting Documentation  Indications: pain   Procedure Details  Location: knee - R knee  Needle size: 25 G  Approach: anteromedial  Medications administered: 80 mg methylPREDNISolone acetate 80 MG/ML; 4 mL lidocaine PF 1% 1 %  Patient tolerance: patient tolerated the procedure well with no immediate complications                   Radiology:   AP, Lateral and merchant views of the right knee  were ordered/reviewed to evauateknee pain.  These were taken in May I did review these she has medial compartment OA moderate to severe right greater than left severe right patellofemoral OA and moderate left patellofemoral OA  Imaging Results (most recent)     None        Assessment/Plan:    Right knee pain with an increase in her symptoms.  I think this is degenerative in origin.  She got such good relief from injection in the past past I think that several reasonable thing to do.  We talked about strengthening when her injections quit working she can consider knee arthroplasty

## (undated) DEVICE — APPL DURAPREP IODOPHOR APL 26ML

## (undated) DEVICE — GLV SURG SENSICARE PI MIC PF SZ7 LF STRL

## (undated) DEVICE — PK KN TOTL 40

## (undated) DEVICE — SUT VIC 1 CT1 36IN J947H

## (undated) DEVICE — PENCL E/S ULTRAVAC TELESCP NOSE HOLSTR 10FT

## (undated) DEVICE — PREMIUM WET SKIN PREP TRAY: Brand: MEDLINE INDUSTRIES, INC.

## (undated) DEVICE — GLV SURG SENSICARE W/ALOE PF LF 8 STRL

## (undated) DEVICE — TRAP FLD MINIVAC MEGADYNE 100ML

## (undated) DEVICE — ADHS SKIN DERMABOND TOP ADVANCED

## (undated) DEVICE — STERILE PATIENT PROTECTIVE PAD FOR IMP® KNEE POSITIONERS & COHESIVE WRAP (10 / CASE): Brand: DE MAYO KNEE POSITIONER®

## (undated) DEVICE — SOL NACL 0.9PCT 100ML SGL

## (undated) DEVICE — MAT FLR ABSORBENT LG 4FT 10 2.5FT

## (undated) DEVICE — GLV SURG SENSICARE W/ALOE PF LF 7.5 STRL

## (undated) DEVICE — PREP SOL POVIDONE/IODINE BT 4OZ

## (undated) DEVICE — 3M™ IOBAN™ 2 ANTIMICROBIAL INCISE DRAPE 6640EZ: Brand: IOBAN™ 2

## (undated) DEVICE — BNDG ELAS ELITE V/CLOSE 6IN 5YD LF STRL

## (undated) DEVICE — UNDERCAST PADDING: Brand: DEROYAL

## (undated) DEVICE — KT DRN EVAC WND PVC PCH WTROC RND 10F400

## (undated) DEVICE — MEDI-VAC YANKAUER SUCTION HANDLE W/BULBOUS TIP: Brand: CARDINAL HEALTH

## (undated) DEVICE — SUT VIC 0 CT1 36IN J946H

## (undated) DEVICE — DUAL CUT SAGITTAL BLADE

## (undated) DEVICE — STPLR SKIN VISISTAT WD 35CT

## (undated) DEVICE — GLV SURG PREMIERPRO ORTHO LTX PF SZ7.5 BRN

## (undated) DEVICE — NEEDLE, QUINCKE 22GX3.5": Brand: MEDLINE INDUSTRIES, INC.

## (undated) DEVICE — GLV SURG SENSICARE PI PF LF 7 GRN STRL